# Patient Record
Sex: FEMALE | Race: WHITE | NOT HISPANIC OR LATINO | Employment: UNEMPLOYED | URBAN - METROPOLITAN AREA
[De-identification: names, ages, dates, MRNs, and addresses within clinical notes are randomized per-mention and may not be internally consistent; named-entity substitution may affect disease eponyms.]

---

## 2017-10-23 PROBLEM — F29 PSYCHOSIS: Status: ACTIVE | Noted: 2017-10-23

## 2021-03-09 DIAGNOSIS — Z34.90 PREGNANCY, UNSPECIFIED GESTATIONAL AGE: Primary | ICD-10-CM

## 2021-03-10 ENCOUNTER — PROCEDURE VISIT (OUTPATIENT)
Dept: OBSTETRICS AND GYNECOLOGY | Facility: CLINIC | Age: 36
End: 2021-03-10
Payer: MEDICAID

## 2021-03-10 DIAGNOSIS — Z34.90 PREGNANCY, UNSPECIFIED GESTATIONAL AGE: ICD-10-CM

## 2021-03-10 LAB
AMPHETAMINES (500): NEGATIVE NG/ML
BARBITURATES (200): NEGATIVE NG/ML
BENZODIAZEPINES: NEGATIVE NG/ML
COCAINE (150): NEGATIVE NG/ML
MARIJUANA QUANTITATION: >300 NG/ML (ref 0–50)
MARIJUANA, THC (50): ABNORMAL NG/ML
METHADONE: NEGATIVE NG/ML
OPIATES: NEGATIVE NG/ML
OXYCODONE: NEGATIVE NG/ML
PH: 5.7 (ref 4.5–8)
PHENCYCLIDINE (25): NEGATIVE NG/ML
URINE CREATININE D/S: 160.6 MG/DL

## 2021-03-10 PROCEDURE — 76817 PR US, OB, TRANSVAG APPROACH: ICD-10-PCS | Mod: S$GLB,,, | Performed by: STUDENT IN AN ORGANIZED HEALTH CARE EDUCATION/TRAINING PROGRAM

## 2021-03-10 PROCEDURE — 76817 TRANSVAGINAL US OBSTETRIC: CPT | Mod: S$GLB,,, | Performed by: STUDENT IN AN ORGANIZED HEALTH CARE EDUCATION/TRAINING PROGRAM

## 2021-03-11 PROBLEM — F12.10 TETRAHYDROCANNABINOL (THC) USE DISORDER, MILD, ABUSE: Status: ACTIVE | Noted: 2021-03-11

## 2021-03-11 LAB
ANTIBODY SCREEN: NEGATIVE
BLOOD GROUPING: NORMAL
BLOOD TYPE (D): POSITIVE
HBV SURFACE AG SERPL QL IA: NONREACTIVE
HCV C PCR RNA CONFIRM: ABNORMAL
HCV IGG SERPL QL IA: REACTIVE
HIV 1+2 AB+HIV1 P24 AG SERPL QL IA: NONREACTIVE
RUBELLA IGG SCREEN: NORMAL
SICKLE CELL PREP: NEGATIVE
SYPHILIS TREPONEMAL ANTIBODY: NONREACTIVE

## 2021-03-16 DIAGNOSIS — Z34.90 PREGNANCY, UNSPECIFIED GESTATIONAL AGE: Primary | ICD-10-CM

## 2021-03-16 LAB
HCV RNA QUANT PCR LOG: 5.36 LOG IU/ML
HCV RNA QUANT PCR: ABNORMAL IU/ML

## 2021-03-17 ENCOUNTER — PROCEDURE VISIT (OUTPATIENT)
Dept: OBSTETRICS AND GYNECOLOGY | Facility: CLINIC | Age: 36
End: 2021-03-17
Payer: MEDICAID

## 2021-03-17 DIAGNOSIS — Z34.90 PREGNANCY, UNSPECIFIED GESTATIONAL AGE: ICD-10-CM

## 2021-03-17 PROCEDURE — 76817 PR US, OB, TRANSVAG APPROACH: ICD-10-PCS | Mod: S$GLB,,, | Performed by: STUDENT IN AN ORGANIZED HEALTH CARE EDUCATION/TRAINING PROGRAM

## 2021-03-17 PROCEDURE — 76817 TRANSVAGINAL US OBSTETRIC: CPT | Mod: S$GLB,,, | Performed by: STUDENT IN AN ORGANIZED HEALTH CARE EDUCATION/TRAINING PROGRAM

## 2021-03-19 ENCOUNTER — OFFICE VISIT (OUTPATIENT)
Dept: OBSTETRICS AND GYNECOLOGY | Facility: CLINIC | Age: 36
End: 2021-03-19
Payer: MEDICAID

## 2021-03-19 VITALS
DIASTOLIC BLOOD PRESSURE: 72 MMHG | HEART RATE: 76 BPM | SYSTOLIC BLOOD PRESSURE: 109 MMHG | WEIGHT: 130.19 LBS | BODY MASS INDEX: 23.96 KG/M2 | HEIGHT: 62 IN

## 2021-03-19 DIAGNOSIS — O02.1 MISSED ABORTION: Primary | ICD-10-CM

## 2021-03-19 PROCEDURE — 99203 OFFICE O/P NEW LOW 30 MIN: CPT | Mod: TH,S$GLB,, | Performed by: STUDENT IN AN ORGANIZED HEALTH CARE EDUCATION/TRAINING PROGRAM

## 2021-03-19 PROCEDURE — 99203 PR OFFICE/OUTPT VISIT, NEW, LEVL III, 30-44 MIN: ICD-10-PCS | Mod: TH,S$GLB,, | Performed by: STUDENT IN AN ORGANIZED HEALTH CARE EDUCATION/TRAINING PROGRAM

## 2021-03-19 RX ORDER — MISOPROSTOL 200 UG/1
800 TABLET ORAL EVERY 12 HOURS
Qty: 8 TABLET | Refills: 0 | Status: SHIPPED | OUTPATIENT
Start: 2021-03-19 | End: 2022-09-09 | Stop reason: CLARIF

## 2021-03-30 ENCOUNTER — TELEPHONE (OUTPATIENT)
Dept: OBSTETRICS AND GYNECOLOGY | Facility: CLINIC | Age: 36
End: 2021-03-30

## 2021-03-31 ENCOUNTER — OFFICE VISIT (OUTPATIENT)
Dept: OBSTETRICS AND GYNECOLOGY | Facility: CLINIC | Age: 36
End: 2021-03-31
Payer: MEDICAID

## 2021-03-31 VITALS
WEIGHT: 132 LBS | BODY MASS INDEX: 24.14 KG/M2 | HEART RATE: 72 BPM | SYSTOLIC BLOOD PRESSURE: 112 MMHG | DIASTOLIC BLOOD PRESSURE: 70 MMHG

## 2021-03-31 DIAGNOSIS — Z33.2 ABORTION IN FIRST TRIMESTER: Primary | ICD-10-CM

## 2021-03-31 PROCEDURE — 99499 NO LOS: ICD-10-PCS | Mod: S$GLB,,, | Performed by: STUDENT IN AN ORGANIZED HEALTH CARE EDUCATION/TRAINING PROGRAM

## 2021-03-31 PROCEDURE — 99499 UNLISTED E&M SERVICE: CPT | Mod: S$GLB,,, | Performed by: STUDENT IN AN ORGANIZED HEALTH CARE EDUCATION/TRAINING PROGRAM

## 2022-09-09 PROBLEM — R65.10 SIRS (SYSTEMIC INFLAMMATORY RESPONSE SYNDROME): Status: ACTIVE | Noted: 2022-09-09

## 2022-09-09 PROBLEM — E87.8 ELECTROLYTE ABNORMALITY: Status: ACTIVE | Noted: 2022-09-09

## 2022-09-09 PROBLEM — R74.01 TRANSAMINITIS: Status: ACTIVE | Noted: 2022-09-09

## 2022-09-09 PROBLEM — F19.10 POLYSUBSTANCE ABUSE: Status: ACTIVE | Noted: 2022-09-09

## 2022-09-10 PROBLEM — B19.20 HEPATITIS C: Status: ACTIVE | Noted: 2022-09-10

## 2022-09-10 PROBLEM — R65.10 SIRS (SYSTEMIC INFLAMMATORY RESPONSE SYNDROME): Status: RESOLVED | Noted: 2022-09-09 | Resolved: 2022-09-10

## 2022-09-10 PROBLEM — K61.0 PERIANAL ABSCESS: Status: ACTIVE | Noted: 2022-09-10

## 2022-09-10 PROBLEM — I33.0 ACUTE BACTERIAL ENDOCARDITIS: Status: ACTIVE | Noted: 2022-09-10

## 2024-11-11 ENCOUNTER — HOSPITAL ENCOUNTER (EMERGENCY)
Facility: HOSPITAL | Age: 39
Discharge: HOME | End: 2024-11-11
Attending: INTERNAL MEDICINE
Payer: MEDICAID

## 2024-11-11 VITALS
OXYGEN SATURATION: 100 % | HEART RATE: 74 BPM | TEMPERATURE: 98 F | DIASTOLIC BLOOD PRESSURE: 72 MMHG | BODY MASS INDEX: 25.55 KG/M2 | WEIGHT: 138.88 LBS | HEIGHT: 62 IN | RESPIRATION RATE: 17 BRPM | SYSTOLIC BLOOD PRESSURE: 118 MMHG

## 2024-11-11 DIAGNOSIS — Z32.01 PREGNANCY TEST POSITIVE: Primary | ICD-10-CM

## 2024-11-11 LAB
B-HCG FREE SERPL-ACNC: ABNORMAL MIU/ML
B-HCG UR QL: POSITIVE
BACTERIA #/AREA URNS AUTO: ABNORMAL /HPF
BILIRUB UR QL STRIP.AUTO: NEGATIVE
CLARITY UR: CLEAR
COLOR UR AUTO: ABNORMAL
CTP QC/QA: YES
GLUCOSE UR QL STRIP: NORMAL
HGB UR QL STRIP: NEGATIVE
HOLD SPECIMEN: NORMAL
HYALINE CASTS #/AREA URNS LPF: ABNORMAL /LPF
KETONES UR QL STRIP: NEGATIVE
LEUKOCYTE ESTERASE UR QL STRIP: NEGATIVE
NITRITE UR QL STRIP: NEGATIVE
PH UR STRIP: 7 [PH]
PROT UR QL STRIP: NEGATIVE
RBC #/AREA URNS AUTO: ABNORMAL /HPF
SP GR UR STRIP.AUTO: 1.01 (ref 1–1.03)
SQUAMOUS #/AREA URNS LPF: ABNORMAL /HPF
UROBILINOGEN UR STRIP-ACNC: NORMAL
WBC #/AREA URNS AUTO: ABNORMAL /HPF

## 2024-11-11 PROCEDURE — 81001 URINALYSIS AUTO W/SCOPE: CPT | Performed by: INTERNAL MEDICINE

## 2024-11-11 PROCEDURE — 84702 CHORIONIC GONADOTROPIN TEST: CPT | Performed by: INTERNAL MEDICINE

## 2024-11-11 PROCEDURE — 99284 EMERGENCY DEPT VISIT MOD MDM: CPT

## 2024-11-11 PROCEDURE — 81025 URINE PREGNANCY TEST: CPT | Performed by: INTERNAL MEDICINE

## 2024-11-11 NOTE — ED PROVIDER NOTES
Encounter Date: 2024       History     Chief Complaint   Patient presents with    Possible Pregnancy     C/o of intermittent lower abd pain with cramping x 4 days, denies vaginal bleeding/discharge; + UPT 4 days ago. LMP 2024. Pt coming from rehab ([facility]). Hx of 2 miscarriages.      Presents from a rehabilitation facility with a positive pregnancy test. Pt states unsure of exact day of LMP but was the last week of September. A2. States in rehab for alcohol and marijuana abuse. Last drink Oct 28. Denies vaginal bleeding, discharge, dysuria or pelvic pain. Presents from confirmation of pregnancy and reassurance of current medication list in pregnancy    The history is provided by the patient.     Review of patient's allergies indicates:  No Known Allergies  Past Medical History:   Diagnosis Date    ADD (attention deficit disorder)     on [facility]    Addiction to drug 2017    Offically diagnosed at [facility] in     ADHD (attention deficit hyperactivity disorder) 2008    ADD treatment on an outpatient basis in [place]    Alcohol abuse 2015    DWI and went to [place] for treatment.    Anxiety 2017    [facility]    Depression 2017    [facility]    Hep B complicating pregnancy     Hep C w/o coma, chronic     Herpes simplex virus (HSV) infection     Hx of psychiatric care 2008    [facility] for ADD    Liver disease     [facility]    Psychiatric problem 2008    Restlessness    Psychosis 2017    [facility]    Therapy 2008    Outpatient therapy in [place]    Thyroid disease [address] [place] [address]     Past Surgical History:   Procedure Laterality Date    TRANSESOPHAGEAL ECHOCARDIOGRAPHY N/A 2022    Procedure: ECHOCARDIOGRAM, TRANSESOPHAGEAL;  Surgeon: Adiel Kirby MD;  Location: [facility];  Service: Cardiology;  Laterality: N/A;     Family History   Problem Relation Name Age of Onset    Anxiety disorder Mother  Chaya     No Known Problems Father Paulino     Bipolar disorder Brother Mike     No Known Problems Maternal Aunt Wm     No Known Problems Maternal Uncle Nevada     No Known Problems Paternal Uncle Caesar     No Known Problems Maternal Grandfather Ronni     No Known Problems Maternal Grandmother Jayna     No Known Problems Paternal Grandfather Phi     No Known Problems Paternal Grandmother Cathie     No Known Problems Cousin Bud     No Known Problems Cousin Karen      Social History     Tobacco Use    Smoking status: Every Day     Types: Cigarettes     Start date: 1/25/2008    Smokeless tobacco: Never   Substance Use Topics    Alcohol use: Not Currently     Alcohol/week: 1.0 standard drink of alcohol     Types: 1 Cans of beer per week     Comment: In the past, says she no longer uses alcohol.     Drug use: Not Currently     Types: Cocaine, Marijuana, Heroin     Review of Systems   Genitourinary:  Positive for menstrual problem.       Physical Exam     Initial Vitals [11/11/24 1243]   BP Pulse Resp Temp SpO2   130/71 77 16 98.1 °F (36.7 °C) 100 %      MAP       --         Physical Exam    Nursing note and vitals reviewed.  Constitutional: She appears well-developed and well-nourished. No distress.   HENT:   Head: Normocephalic and atraumatic. Mouth/Throat: Oropharynx is clear and moist.   Eyes: Conjunctivae and EOM are normal. Pupils are equal, round, and reactive to light.   Neck: Neck supple. No JVD present.   Normal range of motion.  Cardiovascular:  Normal rate, regular rhythm, normal heart sounds and intact distal pulses.           Pulmonary/Chest: Breath sounds normal.   Abdominal: Abdomen is soft. Bowel sounds are normal. She exhibits no distension. There is no abdominal tenderness. There is no rebound and no guarding.   Musculoskeletal:         General: No edema. Normal range of motion.      Cervical back: Normal range of motion and neck supple.     Neurological: She is alert and oriented to  person, place, and time. She has normal strength. GCS score is 15. GCS eye subscore is 4. GCS verbal subscore is 5. GCS motor subscore is 6.   Skin: Skin is warm and dry. No rash noted.   Psychiatric: Her behavior is normal. Thought content normal.         ED Course   ED US Pelvis OB    Date/Time: 11/11/2024 1:20 PM    Performed by: Ramiro Worrell MD  Authorized by: Ramiro Worrell MD    Indication:  Pregnancy  Identified Structures:  Uterus sagittal, Cul-de-sac/Pouch of Pascual, Left adnexa, Hepatorenal space/Hinson's Pouch, Right adnexa and Uterus transverse  Definitive IUP:  Present  Uterine Contents:  Yolk sac  Free fluid in cul-de-sac:  Absent  Free fluid in hepatorenal space:  Absent   6  Location of IUP:  Fundus  Myometrial Mantle:  Adequate  Right adnexa:  Normal  Left adnexa:  Normal  Impression:  IUP  Charge?:  No (educational)    Labs Reviewed   HCG, QUANTITATIVE - Abnormal       Result Value    Beta HCG Quant 23,756.89 (*)    URINALYSIS, REFLEX TO URINE CULTURE - Abnormal    Color, UA Light-Yellow      Appearance, UA Clear      Specific Gravity, UA 1.012      pH, UA 7.0      Protein, UA Negative      Glucose, UA Normal      Ketones, UA Negative      Blood, UA Negative      Bilirubin, UA Negative      Urobilinogen, UA Normal      Nitrites, UA Negative      Leukocyte Esterase, UA Negative      RBC, UA 0-5      WBC, UA 0-5      Bacteria, UA None Seen      Squamous Epithelial Cells, UA Trace (*)     Hyaline Casts, UA None Seen     POCT URINE PREGNANCY - Abnormal    POC Preg Test, Ur Positive (*)      Acceptable Yes     EXTRA TUBES    Narrative:     The following orders were created for panel order EXTRA TUBES.  Procedure                               Abnormality         Status                     ---------                               -----------         ------                     Light Blue Top Hold[[id number]]                             In process                  Red Top Hold[[id number]]                                    In process                 Lavender Top Hold[[id number]]                               In process                 Gold Top Hold[[id number]]                                   In process                 Pink Top Hold[[id number]]                                   In process                   Please view results for these tests on the individual orders.   LIGHT BLUE TOP HOLD   RED TOP HOLD   LAVENDER TOP HOLD   GOLD TOP HOLD   PINK TOP HOLD          Imaging Results    None          Medications - No data to display  Medical Decision Making  Amount and/or Complexity of Data Reviewed  Labs: ordered. Decision-making details documented in ED Course.      Additional MDM:   Differential Diagnosis:   Dif Dx:  Ovarian cyst, ovarian torsion, ectopic pregnancy, malignancy among others                                      Clinical Impression:  Final diagnoses:  [Z32.01] Pregnancy test positive (Primary)          ED Disposition Condition    Discharge Stable          ED Prescriptions    None       Follow-up Information       Follow up With Specialties Details Why Contact Info    [facility] - Emergency Dept Emergency Medicine  If symptoms worsen [address] [facility] [address] [phone]    [facility] Family Medicine Schedule an appointment as soon as possible for a visit in 1 month  [address] [facility] [address] [phone]             Ramiro Worrell MD  11/11/24 5643

## 2024-12-03 ENCOUNTER — HOSPITAL ENCOUNTER (EMERGENCY)
Facility: HOSPITAL | Age: 39
Discharge: HOME | End: 2024-12-03
Attending: EMERGENCY MEDICINE
Payer: MEDICAID

## 2024-12-03 VITALS
HEART RATE: 78 BPM | SYSTOLIC BLOOD PRESSURE: 127 MMHG | WEIGHT: 140 LBS | DIASTOLIC BLOOD PRESSURE: 109 MMHG | BODY MASS INDEX: 25.76 KG/M2 | OXYGEN SATURATION: 100 % | HEIGHT: 62 IN | TEMPERATURE: 98 F | RESPIRATION RATE: 18 BRPM

## 2024-12-03 DIAGNOSIS — O41.8X10 SUBCHORIONIC HEMATOMA IN FIRST TRIMESTER, SINGLE OR UNSPECIFIED FETUS: ICD-10-CM

## 2024-12-03 DIAGNOSIS — O46.8X1 SUBCHORIONIC HEMATOMA IN FIRST TRIMESTER, SINGLE OR UNSPECIFIED FETUS: ICD-10-CM

## 2024-12-03 DIAGNOSIS — O20.0 THREATENED MISCARRIAGE: ICD-10-CM

## 2024-12-03 DIAGNOSIS — O46.90 VAGINAL BLEEDING IN PREGNANCY: Primary | ICD-10-CM

## 2024-12-03 LAB
ALBUMIN SERPL-MCNC: 3.3 G/DL (ref 3.5–5)
ALBUMIN/GLOB SERPL: 1.4 RATIO (ref 1.1–2)
ALP SERPL-CCNC: 41 UNIT/L (ref 40–150)
ALT SERPL-CCNC: 18 UNIT/L (ref 0–55)
AMORPH URATE CRY URNS QL MICRO: ABNORMAL /UL
ANION GAP SERPL CALC-SCNC: 6 MEQ/L
AST SERPL-CCNC: 18 UNIT/L (ref 5–34)
B-HCG FREE SERPL-ACNC: ABNORMAL MIU/ML
B-HCG UR QL: POSITIVE
BACTERIA #/AREA URNS AUTO: ABNORMAL /HPF
BASOPHILS # BLD AUTO: 0.04 X10(3)/MCL
BASOPHILS NFR BLD AUTO: 0.4 %
BILIRUB SERPL-MCNC: 0.4 MG/DL
BILIRUB UR QL STRIP.AUTO: NEGATIVE
BUN SERPL-MCNC: 11.2 MG/DL (ref 7–18.7)
C TRACH DNA SPEC QL NAA+PROBE: NOT DETECTED
CALCIUM SERPL-MCNC: 8.6 MG/DL (ref 8.4–10.2)
CHLORIDE SERPL-SCNC: 106 MMOL/L (ref 98–107)
CLARITY UR: ABNORMAL
CO2 SERPL-SCNC: 24 MMOL/L (ref 22–29)
COLOR UR AUTO: YELLOW
CREAT SERPL-MCNC: 0.69 MG/DL (ref 0.55–1.02)
CREAT/UREA NIT SERPL: 16
CTP QC/QA: YES
EOSINOPHIL # BLD AUTO: 0.24 X10(3)/MCL (ref 0–0.9)
EOSINOPHIL NFR BLD AUTO: 2.7 %
ERYTHROCYTE [DISTWIDTH] IN BLOOD BY AUTOMATED COUNT: 12.1 % (ref 11.5–17)
GFR SERPLBLD CREATININE-BSD FMLA CKD-EPI: >60 ML/MIN/1.73/M2
GLOBULIN SER-MCNC: 2.4 GM/DL (ref 2.4–3.5)
GLUCOSE SERPL-MCNC: 93 MG/DL (ref 74–100)
GLUCOSE UR QL STRIP: NORMAL
GROUP & RH: NORMAL
HCT VFR BLD AUTO: 35.3 % (ref 37–47)
HGB BLD-MCNC: 12.2 G/DL (ref 12–16)
HGB UR QL STRIP: ABNORMAL
IMM GRANULOCYTES # BLD AUTO: 0.03 X10(3)/MCL (ref 0–0.04)
IMM GRANULOCYTES NFR BLD AUTO: 0.3 %
KETONES UR QL STRIP: NEGATIVE
LEUKOCYTE ESTERASE UR QL STRIP: 75
LYMPHOCYTES # BLD AUTO: 2.75 X10(3)/MCL (ref 0.6–4.6)
LYMPHOCYTES NFR BLD AUTO: 30.7 %
MCH RBC QN AUTO: 30.9 PG (ref 27–31)
MCHC RBC AUTO-ENTMCNC: 34.6 G/DL (ref 33–36)
MCV RBC AUTO: 89.4 FL (ref 80–94)
MONOCYTES # BLD AUTO: 0.45 X10(3)/MCL (ref 0.1–1.3)
MONOCYTES NFR BLD AUTO: 5 %
MUCOUS THREADS URNS QL MICRO: ABNORMAL /LPF
N GONORRHOEA DNA SPEC QL NAA+PROBE: NOT DETECTED
NEUTROPHILS # BLD AUTO: 5.46 X10(3)/MCL (ref 2.1–9.2)
NEUTROPHILS NFR BLD AUTO: 60.9 %
NITRITE UR QL STRIP: NEGATIVE
NRBC BLD AUTO-RTO: 0 %
PH UR STRIP: 6.5 [PH]
PLATELET # BLD AUTO: 179 X10(3)/MCL (ref 130–400)
PMV BLD AUTO: 10.2 FL (ref 7.4–10.4)
POTASSIUM SERPL-SCNC: 3.8 MMOL/L (ref 3.5–5.1)
PROT SERPL-MCNC: 5.7 GM/DL (ref 6.4–8.3)
PROT UR QL STRIP: ABNORMAL
RBC # BLD AUTO: 3.95 X10(6)/MCL (ref 4.2–5.4)
RBC #/AREA URNS AUTO: ABNORMAL /HPF
SODIUM SERPL-SCNC: 136 MMOL/L (ref 136–145)
SOURCE (OHS): NORMAL
SP GR UR STRIP.AUTO: 1.02 (ref 1–1.03)
SQUAMOUS #/AREA URNS LPF: ABNORMAL /HPF
UROBILINOGEN UR STRIP-ACNC: NORMAL
WBC # BLD AUTO: 8.97 X10(3)/MCL (ref 4.5–11.5)
WBC #/AREA URNS AUTO: ABNORMAL /HPF

## 2024-12-03 PROCEDURE — 25000003 PHARM REV CODE 250: Performed by: EMERGENCY MEDICINE

## 2024-12-03 PROCEDURE — 81025 URINE PREGNANCY TEST: CPT | Performed by: EMERGENCY MEDICINE

## 2024-12-03 PROCEDURE — 85025 COMPLETE CBC W/AUTO DIFF WBC: CPT | Performed by: EMERGENCY MEDICINE

## 2024-12-03 PROCEDURE — 87591 N.GONORRHOEAE DNA AMP PROB: CPT | Performed by: EMERGENCY MEDICINE

## 2024-12-03 PROCEDURE — 80053 COMPREHEN METABOLIC PANEL: CPT | Performed by: EMERGENCY MEDICINE

## 2024-12-03 PROCEDURE — 84702 CHORIONIC GONADOTROPIN TEST: CPT | Performed by: EMERGENCY MEDICINE

## 2024-12-03 PROCEDURE — 86901 BLOOD TYPING SEROLOGIC RH(D): CPT | Performed by: EMERGENCY MEDICINE

## 2024-12-03 PROCEDURE — 99284 EMERGENCY DEPT VISIT MOD MDM: CPT | Mod: 25

## 2024-12-03 PROCEDURE — 81001 URINALYSIS AUTO W/SCOPE: CPT | Performed by: EMERGENCY MEDICINE

## 2024-12-03 RX ORDER — ACETAMINOPHEN 500 MG
1000 TABLET ORAL
Status: COMPLETED | OUTPATIENT
Start: 2024-12-03 | End: 2024-12-03

## 2024-12-03 RX ADMIN — ACETAMINOPHEN 1000 MG: 500 TABLET ORAL at 07:12

## 2024-12-03 NOTE — Clinical Note
"Joana"Juani Thomas was seen and treated in our emergency department on 12/3/2024.  She may return to work on 12/04/2024.       If you have any questions or concerns, please don't hesitate to call.      Ml Sheehan MD"

## 2024-12-03 NOTE — ED PROVIDER NOTES
Encounter Date: 12/3/2024       History     Chief Complaint   Patient presents with    Vaginal Bleeding     Pt c/o vaginal bleeding that started this AM, reports she's 12wks pregnant.      An year old , approximately 10 weeks by LMP on 2024, here for vaginal spotting and pelvic cramping that began this morning.  No passage of tissue reported.  Her last 2 pregnancies ended in miscarriage, therefore she became concerned.  She denies any nausea, vomiting, or urinary symptoms.  She does suffer from constipation but is passing stool.  She is currently taking Flagyl for BV. She is also on suboxone, seroquel and vraylar for history of substance abuse and bipolar disorder. She has not yet established care with OB; awaiting an appointment at [facility] scheduled in January. She is attempting to get an earlier appointment with Dr. Hensley.     The history is provided by the patient.     Review of patient's allergies indicates:  No Known Allergies  Past Medical History:   Diagnosis Date    ADD (attention deficit disorder)     on [facility]    Addiction to drug 2017    Offically diagnosed at [facility] in     ADHD (attention deficit hyperactivity disorder) 2008    ADD treatment on an outpatient basis in [place]    Alcohol abuse 2015    DWI and went to [place] for treatment.    Anxiety 2017    [facility]    Depression     [facility]    Hep B complicating pregnancy     Hep C w/o coma, chronic     Herpes simplex virus (HSV) infection     Hx of psychiatric care 2008    [facility] for [facility]    Liver disease     [facility]    Psychiatric problem 2008    Restlessness    Psychosis 2017    [facility]    Therapy 2008    Outpatient therapy in [place]    Thyroid disease     [facility]     Past Surgical History:   Procedure Laterality Date    TRANSESOPHAGEAL ECHOCARDIOGRAPHY N/A 2022    Procedure: ECHOCARDIOGRAM, TRANSESOPHAGEAL;   Surgeon: Adiel Kirby MD;  Location: [facility];  Service: Cardiology;  Laterality: N/A;     Family History   Problem Relation Name Age of Onset    Anxiety disorder Mother Chaya     No Known Problems Father Paulino     Bipolar disorder Brother Mike     No Known Problems Maternal Aunt Wm     No Known Problems Maternal Uncle Nevada     No Known Problems Paternal Uncle Caesar     No Known Problems Maternal Grandfather Ronni     No Known Problems Maternal Grandmother Jayna     No Known Problems Paternal Grandfather Phi     No Known Problems Paternal Grandmother Cathie     No Known Problems Cousin Bud     No Known Problems Cousin Karen      Social History     Tobacco Use    Smoking status: Every Day     Types: Cigarettes     Start date: 1/25/2008    Smokeless tobacco: Never   Substance Use Topics    Alcohol use: Not Currently     Alcohol/week: 1.0 standard drink of alcohol     Types: 1 Cans of beer per week     Comment: In the past, says she no longer uses alcohol.     Drug use: Not Currently     Types: Cocaine, Marijuana, Heroin     Review of Systems   Constitutional:  Negative for fever.   HENT:  Negative for nosebleeds.    Respiratory:  Negative for cough and shortness of breath.    Cardiovascular:  Negative for chest pain and leg swelling.   Gastrointestinal:  Positive for constipation. Negative for abdominal pain, blood in stool, diarrhea, nausea and vomiting.   Genitourinary:  Positive for pelvic pain and vaginal bleeding. Negative for dysuria, flank pain, frequency and hematuria.   Musculoskeletal:  Negative for back pain.   Skin:  Negative for pallor.   Neurological:  Negative for light-headedness and headaches.       Physical Exam     Initial Vitals [12/03/24 0631]   BP Pulse Resp Temp SpO2   (!) 109/59 77 16 97.7 °F (36.5 °C) 99 %      MAP       --         Physical Exam    Nursing note and vitals reviewed.  Constitutional: She appears well-developed and well-nourished. She is not  diaphoretic. No distress.   HENT:   Head: Normocephalic and atraumatic. Mouth/Throat: Oropharynx is clear and moist.   Eyes: Conjunctivae and EOM are normal.   Neck: Neck supple.   Cardiovascular:  Normal rate, regular rhythm and normal heart sounds.           Pulmonary/Chest: Breath sounds normal. No respiratory distress.   Abdominal: Abdomen is soft. Bowel sounds are normal. She exhibits no distension. There is abdominal tenderness (mild suprapubic tenderness). There is no rebound and no guarding.   Genitourinary:    Vaginal discharge (bloody discharge, scant) present.      Genitourinary Comments: No adnexal masses or tenderness  Cervical os is closed  No cervicitis or CMT     Musculoskeletal:         General: No edema. Normal range of motion.      Cervical back: Neck supple.     Neurological: She is alert and oriented to person, place, and time. She has normal strength.   Skin: Skin is warm and dry. Capillary refill takes less than 2 seconds. No pallor.   Psychiatric: She has a normal mood and affect.         ED Course   Procedures  Labs Reviewed   COMPREHENSIVE METABOLIC PANEL - Abnormal       Result Value    Sodium 136      Potassium 3.8      Chloride 106      CO2 24      Glucose 93      Blood Urea Nitrogen 11.2      Creatinine 0.69      Calcium 8.6      Protein Total 5.7 (*)     Albumin 3.3 (*)     Globulin 2.4      Albumin/Globulin Ratio 1.4      Bilirubin Total 0.4      ALP 41      ALT 18      AST 18      eGFR >60      Anion Gap 6.0      BUN/Creatinine Ratio 16     HCG, QUANTITATIVE - Abnormal    Beta HCG Quant 143,117.83 (*)    URINALYSIS, REFLEX TO URINE CULTURE - Abnormal    Color, UA Yellow      Appearance, UA Turbid (*)     Specific Gravity, UA 1.019      pH, UA 6.5      Protein, UA Trace (*)     Glucose, UA Normal      Ketones, UA Negative      Blood, UA 3+ (*)     Bilirubin, UA Negative      Urobilinogen, UA Normal      Nitrites, UA Negative      Leukocyte Esterase, UA 75 (*)     RBC, UA 0-5      WBC,  UA 0-5      Bacteria, UA Trace      Squamous Epithelial Cells, UA Moderate (*)     Mucous, UA Trace (*)     Amorphous Crystal, UA Trace (*)    CBC WITH DIFFERENTIAL - Abnormal    WBC 8.97      RBC 3.95 (*)     Hgb 12.2      Hct 35.3 (*)     MCV 89.4      MCH 30.9      MCHC 34.6      RDW 12.1      Platelet 179      MPV 10.2      Neut % 60.9      Lymph % 30.7      Mono % 5.0      Eos % 2.7      Basophil % 0.4      Lymph # 2.75      Neut # 5.46      Mono # 0.45      Eos # 0.24      Baso # 0.04      IG# 0.03      IG% 0.3      NRBC% 0.0     POCT URINE PREGNANCY - Abnormal    POC Preg Test, Ur Positive (*)      Acceptable Yes     CHLAMYDIA/GONORRHOEAE(GC), PCR   CBC W/ AUTO DIFFERENTIAL    Narrative:     The following orders were created for panel order CBC W/ AUTO DIFFERENTIAL.  Procedure                               Abnormality         Status                     ---------                               -----------         ------                     CBC with Differential[[id number]]       Abnormal            Final result                 Please view results for these tests on the individual orders.   GROUP & RH    Group & Rh O POS            Imaging Results              US OB <14 Wks, TransAbd, Single Gestation (Final result)  Result time 12/03/24 08:31:55   Procedure changed from US OB <14 Wks TransAbd & TransVag, Single Gestation (XPD)     Final result by Cy Key MD (12/03/24 08:31:55)                   Impression:      Single live intrauterine pregnancy with ultrasound age by crown-rump length 9 weeks 4 days. Detailed fetal anatomic survey is recommended as an outpatient under OB supervision at 18-20 weeks gestation.      Electronically signed by: Cy Key  Date:    12/03/2024  Time:    08:31               Narrative:    EXAMINATION:  US OB <14 WEEKS TRANSABDOM, SINGLE GESTATION    CLINICAL HISTORY:  Vag Bleeding;    COMPARISON:  None    FINDINGS:  Transabdominal scanning of the pelvis with  grayscale, color and spectral Doppler.    Anteverted uterus measures 11 cm in length.  There is single live intrauterine pregnancy.  The ultrasound age by crown-rump length is 9 weeks 4 days.  Crown-rump length 27 mm.  Embryonic heart rate 159-163 BPM.  There is subchorionic hemorrhage measuring 24 x 23 x 13 mm.    Right ovary normal in appearance for age with vascular flow demonstrated.    Left ovary not seen.    No adnexal mass or significant pelvic free fluid.                                       Medications   acetaminophen tablet 1,000 mg (1,000 mg Oral Given 12/3/24 0716)     Medical Decision Making  39-year-old , approximately 10 weeks pregnant, here for vaginal spotting and pelvic pain.  She is hemodynamically stable, abdominal exam is benign, cervical os is closed.  Labs with pelvic ultrasound will be obtained.  Tylenol will be given for pain.  Patient is attempting to follow up with Dr. Hensley this week and has been advised to make that appointment as soon as possible.     Differential diagnosis includes but isn't limited to threatened miscarriage, ectopic pregnancy, subchorionic hemorrhage, cervicitis, and others    Amount and/or Complexity of Data Reviewed  Labs: ordered. Decision-making details documented in ED Course.  Radiology: ordered. Decision-making details documented in ED Course.    Risk  OTC drugs.               ED Course as of 24 0928   Tue Dec 03, 2024   0922 Labs unremarkable, I do not suspect UTI at this time.  Ultrasound consistent with single, live IUP at 9 weeks 4 days.  There is also a small subchorionic hemorrhage.  I have discussed these findings with the patient along with strict return precautions and the need for pelvic rest and hydrate.  She is strongly advised to contact Dr. Hensley today to make an appointment this week or early next week for re-evaluation. She will be contacted if GC/C is positive. She is discharged in stable condition. [RB]      ED Course User  "Index  [RB] Ml Sheehan MD                Blood pressure (!) 127/109, pulse 78, temperature 97.7 °F (36.5 °C), temperature source Oral, resp. rate 18, height 5' 2" (1.575 m), weight 63.5 kg (140 lb), last menstrual period 09/21/2024, SpO2 100%.  s           Clinical Impression:  Final diagnoses:  [O46.90] Vaginal bleeding in pregnancy (Primary)  [O20.0] Threatened miscarriage  [O41.8X10, O46.8X1] Subchorionic hematoma in first trimester, single or unspecified fetus          ED Disposition Condition    Discharge Stable          ED Prescriptions    None       Follow-up Information       Follow up With Specialties Details Why Contact Info    [facility] - Emergency Dept Emergency Medicine  As needed, If symptoms worsen [address] [place] [id number]  [phone]             Ml Sheehan MD  12/03/24 0925       Ml Sheehan MD  12/03/24 0928    "

## 2025-01-07 ENCOUNTER — HOSPITAL ENCOUNTER (OUTPATIENT)
Dept: RADIOLOGY | Facility: HOSPITAL | Age: 40
Discharge: HOME | End: 2025-01-07
Attending: OBSTETRICS & GYNECOLOGY
Payer: MEDICAID

## 2025-01-07 ENCOUNTER — OFFICE VISIT (OUTPATIENT)
Dept: FAMILY MEDICINE | Facility: CLINIC | Age: 40
End: 2025-01-07
Payer: MEDICAID

## 2025-01-07 ENCOUNTER — TELEPHONE (OUTPATIENT)
Dept: FAMILY MEDICINE | Facility: CLINIC | Age: 40
End: 2025-01-07
Payer: MEDICAID

## 2025-01-07 VITALS
SYSTOLIC BLOOD PRESSURE: 124 MMHG | RESPIRATION RATE: 18 BRPM | TEMPERATURE: 98 F | HEIGHT: 62 IN | WEIGHT: 150.38 LBS | DIASTOLIC BLOOD PRESSURE: 67 MMHG | BODY MASS INDEX: 27.67 KG/M2 | HEART RATE: 68 BPM | OXYGEN SATURATION: 100 %

## 2025-01-07 DIAGNOSIS — O44.40 LOW-LYING PLACENTA: ICD-10-CM

## 2025-01-07 DIAGNOSIS — Z34.90 PREGNANCY: ICD-10-CM

## 2025-01-07 DIAGNOSIS — O99.322 SUBOXONE MAINTENANCE TREATMENT COMPLICATING PREGNANCY, ANTEPARTUM, SECOND TRIMESTER: ICD-10-CM

## 2025-01-07 DIAGNOSIS — B00.9 HSV INFECTION: ICD-10-CM

## 2025-01-07 DIAGNOSIS — F19.10 POLYSUBSTANCE ABUSE: ICD-10-CM

## 2025-01-07 DIAGNOSIS — Z3A.15 15 WEEKS GESTATION OF PREGNANCY: Primary | ICD-10-CM

## 2025-01-07 DIAGNOSIS — Z86.19 HISTORY OF HEPATITIS C: ICD-10-CM

## 2025-01-07 DIAGNOSIS — F11.20 SUBOXONE MAINTENANCE TREATMENT COMPLICATING PREGNANCY, ANTEPARTUM, SECOND TRIMESTER: ICD-10-CM

## 2025-01-07 DIAGNOSIS — Z86.19 HISTORY OF HEPATITIS B: ICD-10-CM

## 2025-01-07 LAB
ALBUMIN SERPL-MCNC: 3.8 G/DL (ref 3.5–5)
ALBUMIN/GLOB SERPL: 1 RATIO (ref 1.1–2)
ALP SERPL-CCNC: 56 UNIT/L (ref 40–150)
ALT SERPL-CCNC: 14 UNIT/L (ref 0–55)
AMPHET UR QL SCN: NEGATIVE
ANION GAP SERPL CALC-SCNC: 5 MEQ/L
AST SERPL-CCNC: 20 UNIT/L (ref 5–34)
BARBITURATE SCN PRESENT UR: NEGATIVE
BASOPHILS # BLD AUTO: 0.06 X10(3)/MCL
BASOPHILS NFR BLD AUTO: 0.5 %
BENZODIAZ UR QL SCN: NEGATIVE
BILIRUB SERPL-MCNC: 0.4 MG/DL
BILIRUB SERPL-MCNC: NORMAL MG/DL
BLOOD URINE, POC: NORMAL
BUN SERPL-MCNC: 9.1 MG/DL (ref 7–18.7)
CALCIUM SERPL-MCNC: 9.4 MG/DL (ref 8.4–10.2)
CANNABINOIDS UR QL SCN: NEGATIVE
CHLORIDE SERPL-SCNC: 105 MMOL/L (ref 98–107)
CLARITY, POC UA: NORMAL
CO2 SERPL-SCNC: 27 MMOL/L (ref 22–29)
COCAINE UR QL SCN: NEGATIVE
COLOR, POC UA: YELLOW
CREAT SERPL-MCNC: 0.6 MG/DL (ref 0.55–1.02)
CREAT/UREA NIT SERPL: 15
EOSINOPHIL # BLD AUTO: 0.26 X10(3)/MCL (ref 0–0.9)
EOSINOPHIL NFR BLD AUTO: 2.2 %
ERYTHROCYTE [DISTWIDTH] IN BLOOD BY AUTOMATED COUNT: 12.6 % (ref 11.5–17)
FENTANYL UR QL SCN: NEGATIVE
GFR SERPLBLD CREATININE-BSD FMLA CKD-EPI: >60 ML/MIN/1.73/M2
GLOBULIN SER-MCNC: 3.9 GM/DL (ref 2.4–3.5)
GLUCOSE SERPL-MCNC: 78 MG/DL (ref 74–100)
GLUCOSE UR QL STRIP: NORMAL
GROUP & RH: NORMAL
HCT VFR BLD AUTO: 38.1 % (ref 37–47)
HCV AB SERPL QL IA: REACTIVE
HGB BLD-MCNC: 12.9 G/DL (ref 12–16)
HIV 1+2 AB+HIV1 P24 AG SERPL QL IA: NONREACTIVE
IMM GRANULOCYTES # BLD AUTO: 0.09 X10(3)/MCL (ref 0–0.04)
IMM GRANULOCYTES NFR BLD AUTO: 0.8 %
INDIRECT COOMBS: NORMAL
KETONES UR QL STRIP: NORMAL
LEUKOCYTE ESTERASE URINE, POC: NORMAL
LYMPHOCYTES # BLD AUTO: 3.53 X10(3)/MCL (ref 0.6–4.6)
LYMPHOCYTES NFR BLD AUTO: 30.4 %
MCH RBC QN AUTO: 30.4 PG (ref 27–31)
MCHC RBC AUTO-ENTMCNC: 33.9 G/DL (ref 33–36)
MCV RBC AUTO: 89.9 FL (ref 80–94)
MDMA UR QL SCN: NEGATIVE
MONOCYTES # BLD AUTO: 0.55 X10(3)/MCL (ref 0.1–1.3)
MONOCYTES NFR BLD AUTO: 4.7 %
NEUTROPHILS # BLD AUTO: 7.14 X10(3)/MCL (ref 2.1–9.2)
NEUTROPHILS NFR BLD AUTO: 61.4 %
NITRITE, POC UA: NORMAL
NRBC BLD AUTO-RTO: 0 %
OPIATES UR QL SCN: NEGATIVE
PCP UR QL: NEGATIVE
PH UR: 6.5 [PH] (ref 3–11)
PH, POC UA: 6.5
PLATELET # BLD AUTO: 244 X10(3)/MCL (ref 130–400)
PMV BLD AUTO: 10.5 FL (ref 7.4–10.4)
POTASSIUM SERPL-SCNC: 3.8 MMOL/L (ref 3.5–5.1)
PROT SERPL-MCNC: 7.7 GM/DL (ref 6.4–8.3)
PROTEIN, POC: NORMAL
RBC # BLD AUTO: 4.24 X10(6)/MCL (ref 4.2–5.4)
SODIUM SERPL-SCNC: 137 MMOL/L (ref 136–145)
SPECIFIC GRAVITY, POC UA: 1.02
SPECIFIC GRAVITY, URINE AUTO (.000) (OHS): 1.02 (ref 1–1.03)
SPECIMEN OUTDATE: NORMAL
T PALLIDUM AB SER QL: NONREACTIVE
UROBILINOGEN, POC UA: 0.2
WBC # BLD AUTO: 11.63 X10(3)/MCL (ref 4.5–11.5)

## 2025-01-07 PROCEDURE — 87340 HEPATITIS B SURFACE AG IA: CPT

## 2025-01-07 PROCEDURE — 80053 COMPREHEN METABOLIC PANEL: CPT

## 2025-01-07 PROCEDURE — 81511 FTL CGEN ABNOR FOUR ANAL: CPT

## 2025-01-07 PROCEDURE — 86850 RBC ANTIBODY SCREEN: CPT

## 2025-01-07 PROCEDURE — 87086 URINE CULTURE/COLONY COUNT: CPT

## 2025-01-07 PROCEDURE — 80307 DRUG TEST PRSMV CHEM ANLYZR: CPT

## 2025-01-07 PROCEDURE — 85025 COMPLETE CBC W/AUTO DIFF WBC: CPT

## 2025-01-07 PROCEDURE — 76805 OB US >/= 14 WKS SNGL FETUS: CPT | Mod: TC

## 2025-01-07 PROCEDURE — 86762 RUBELLA ANTIBODY: CPT

## 2025-01-07 PROCEDURE — 86803 HEPATITIS C AB TEST: CPT

## 2025-01-07 PROCEDURE — 87389 HIV-1 AG W/HIV-1&-2 AB AG IA: CPT

## 2025-01-07 PROCEDURE — 99214 OFFICE O/P EST MOD 30 MIN: CPT | Mod: PBBFAC,25

## 2025-01-07 PROCEDURE — 86787 VARICELLA-ZOSTER ANTIBODY: CPT

## 2025-01-07 PROCEDURE — 85660 RBC SICKLE CELL TEST: CPT

## 2025-01-07 PROCEDURE — 36415 COLL VENOUS BLD VENIPUNCTURE: CPT

## 2025-01-07 PROCEDURE — 86780 TREPONEMA PALLIDUM: CPT

## 2025-01-07 NOTE — PROGRESS NOTES
[facility] OB OFFICE VISIT NOTE     Primary PCP: None    Chief Complaint     Joana Thomas is a 39 y.o. female   at 15w3d, JEANNETTE 2025, by Last Menstrual Period presenting to [facility] for initial OB visit.    HPI: Lives in a group home, and does not have  custody of her other children. Was recently at an IP rehab and transition to OP rehab where she was being prescribed Suboxone. Per patient, she stopped attending and the program stopped refilling her prescriptions. Per patient, has been sober for 79 days. Reports that she is still smoking 4 cigarettes a day as well as vaping.     Relevant PMH:    - ADD/ADHD -- No currently taking medication  - Depression/Anxiety -- Xanax and Seroquel   - HSV -- No current oral or genital lesions, per patient  - History of Hep B/Hep C infection  - History of polysubstance abuse -- Suboxone use  Obstetrics History     OB History    Para Term  AB Living   8 4 2 2 3 4   SAB IAB Ectopic Multiple Live Births   2 1 0 0 4      # Outcome Date GA Lbr Abad/2nd Weight Sex Type Anes PTL Lv   8 Current            7 SAB 2024 4w0d    SAB   FD   6 Term 21 40w0d  2.722 kg (6 lb) M Vag-Spont EPI N ROEL   5 IAB  12w0d    TAB   FD      Complications: History of dilatation and curettage   4 2018 8w0d    SAB   FD   3  16 28w0d  1.361 kg (3 lb) F Vag-Spont None Y ROEL   2  10/21/15 28w5d  1.49 kg (3 lb 4.6 oz) M Vag-Spont None Y ROEL   1 Term 09 40w0d  2.92 kg (6 lb 7 oz) M Vag-Spont EPI N ROEL      Gynecology History   - LMP: 26  - Menstrual hx: regular, 5 days per period  - History of STDs and/or Abnormal PAPs: Denies  - History of prior : No    Medical History    Past Medical History: As above in HPI  Surgical History:   Family History: Reports no pertinent FHx  Social History: Smoking 4 cigarettes per day, vaping. Denies alcohol and other illicit drug use  Medications:   Current Outpatient Medications   Medication Sig     "ALPRAZolam (XANAX) 2 MG Tab Take 0.5 tablets by mouth 2 (two) times daily as needed for Anxiety.    FEROSUL 325 mg (65 mg iron) Tab tablet Take 1 tablet by mouth 2 (two) times daily.    QUEtiapine (SEROQUEL) 100 MG Tab Take 100 mg by mouth every evening.    SUBOXONE 8-2 mg Place 2 Film under the tongue once daily.     No current facility-administered medications for this visit.       Review of Systems   Review of Systems   Constitutional:  Negative for activity change and appetite change.   HENT:  Negative for congestion and sore throat.    Eyes:  Negative for visual disturbance.   Respiratory:  Negative for cough, chest tightness and shortness of breath.    Cardiovascular:  Negative for chest pain.   Gastrointestinal:  Negative for abdominal pain, nausea and vomiting.   Genitourinary:  Negative for dysuria and hematuria.   Skin:  Negative for rash.   Neurological:  Negative for dizziness and weakness.   Psychiatric/Behavioral:  Negative for dysphoric mood.      Antepartum specific   - Fetal movements: No  - Vaginal bleeding: No  - Vaginal discharge: No  - Loss of fluid: No  - Contractions: No  - Headaches: No  - Vision changes: No  - Edema: No    Vital Signs     Vitals:    01/07/25 1205   BP: 124/67   BP Location: Right arm   Patient Position: Sitting   Pulse: 68   Resp: 18   Temp: 98.4 °F (36.9 °C)   TempSrc: Oral   SpO2: 100%   Weight: 68.2 kg (150 lb 6.4 oz)   Height: 5' 2" (1.575 m)     Physical Exam   General: in no acute distress  RESP: clear to auscultation bilaterally, non labored  CV: regular rate and rhythm, no murmurs, no edema  ABD: non-tender, BS+  FHTs: 135 bpm via Doppler  Fundal height: Below level of umbilicus    Labs   Urine dipstick:   Lab Results   Component Value Date    COLORU Yellow 01/07/2025    SPECGRAV 1.025 01/07/2025    PHUR 6.5 01/07/2025    WBCUR trace 01/07/2025    NITRITE neg 01/07/2025    PROTEINPOC neg 01/07/2025    GLUCOSEUR neg 01/07/2025    KETONESU neg 01/07/2025    " UROBILINOGEN 0.2 2025    BILIRUBINPOC neg 2025    RBCUR neg 2025       Initial OB Labs: (ordered 25, except GC/Chlamydia and Pap. Needs to be completed at next visit)  - Blood Type and Rh: O+  - Antibody Screen: Negative  - CBC H/H: 12.9/38.1  - BUN/Cr: 9.1/0.60  - HIV:   - RPR:   - GC:   - CT:   - HBsAg:    - HCVAb:   - Rubella:   - Varicella:   - UA & Culture:   - Sickle Cell Screen:    - PAP:   - Influenza vaccine date: Informed refusal   - Previous  (N/A if not applicable): N/A  - BTL desired (N/A if not applicable):     15-20 Weeks Lab: (collected 25)  - Quad Screen:     28 Week Labs:   - 1H GTT:   - Rhogam (N/A if not applicable):   - Date of Tdap:   - CBC H/H:   - RPR:   -  Consent Form Signed (N/A if not applicable):   - BTL consent:   - [facility] [place] care:    37 Week Labs:  - CBC H/H:   - RPR:   - GBS Culture:    - HIV:   - Cervical GC:   - Cervical Exam:     38 Weeks:  - Cervical Exam:     39 Weeks:  - Cervical Exam:  - NST Exam:    FM Continuity Patient: no  Previous : no    Scheduled delivery: induction or  (can NOT be scheduled any earlier than 39w0d):    Imaging    Initial US:   - 12/3/24: Single live intrauterine pregnancy with ultrasound age by crown-rump length 9 weeks 4 days. Detailed fetal anatomic survey is recommended as an outpatient under OB supervision at 18-20 weeks gestation.      - 25: Single live intrauterine pregnancy with average ultrasound age 14 weeks 5 days.  Low lying placenta.    Anatomy Scan:    Assessment     1. 15 weeks gestation of pregnancy    2. Polysubstance abuse    3. Suboxone maintenance treatment complicating pregnancy, antepartum, second trimester    4. History of hepatitis B    5. History of hepatitis C    6. HSV infection    7. Low-lying placenta      Plan     15 weeks gestation of pregnancy  - OB Protocol discussed  - PNVs  - Urine dip reviewed as above  - Routine labs: ordered as above, except  GC/Chlamydia and Pap (Will need to be completed at next visit in 2 weeks). Patient running late for next appointment at 12:30   - Mother plans to bottle feed  - Postpartum contraception discussion: Undecided  - ED precautions discussed: vaginal bleeding or leaking fluid, belly cramping or pain, SOB/chest pain, swelling of the face/lower extremities, vision changes. If don't feel the baby move in over an hour. Severe headache that are not resolved with medication     Suboxone maintenance treatment complicated pregnancy antepartum, second trimester   Polysubstance abuse  - UDA negative 1/7/25  - Patient reporting needing assistance in getting into program after being released from previous OP program  - Clinic  Eli Ram consulted and provided patient with resources. Will continue to contact throughout pregnancy for associated social needs and resources   - Referred to MFM. Will follow recommendations    History of Hepatitis C  History of Hepatitis B  HSV infection  - No known treatment for Hep C   - No current oral or genital lesions   - Referred to MFM. Will follow recommendations    Low lying placenta  - Noted on US 1/7/25  - Referred to MFM. Will follow recommendations        Return to clinic in 2 weeks for routine OB clinic visit.       Angelique Salcedo DO  [facility]

## 2025-01-07 NOTE — PATIENT INSTRUCTIONS
Well Child Exam    About this topic  A well child exam is a visit with your child's doctor to check your child's health. The doctor will check your child's growth, progress, and shot record. It is also a time for you to ask your child's doctor any questions you have about your child's health. Your child will have a full exam during the office visit. Other things that are sometimes checked are hearing, eyesight, and urine or blood tests. The doctor may give shots during your child's well visit.    General    Getting Ready for a Well Child Exam    A well child exam is a good time for you to talk with your child's doctor about any of these topics:    Eating habits or diet    How your child acts    Sleep issues    Growth    Safety    Vaccines    Toilet training    Teen years    How your child is doing in school or any learning concerns    Home life    You may want to make a written list of the things you want to talk about with your child's doctor. Be sure to bring your list of questions to your child's well visit. You may also want to do some research on your own before your office visit by reading books or looking at Web sites. Other family members, child caregivers, and grandparents may be able to help you too. Your child's doctor may ask also you about your family's health history or if your child is around anyone who smokes.    The Exam    The doctor measures your child's weight, height, and sometimes head size or body mass index (BMI). The doctor plots these numbers on a growth curve. The growth curve gives a picture of your baby's growth at each visit. The doctor may check your child's temperature, blood pressure, breathing, and heart rate. The doctor may listen to your child's heart, lungs, and belly. Your doctor will do a full exam of your child from the head to the toes.    Growth and Development Questions    Your doctor will ask you about your child's progress. The doctor will focus on the skills that are  likely to happen at your child's age. Some of these are motor skills like rolling over, walking, and running, while others are social skills, or how your child interacts with other people. Your child's doctor will also ask you how your child is doing in school.    Help for Parents    Your doctor will talk with you about any concerns you have about your child during this visit. The doctor may also talk with you about:    Getting family help or other support    Ways to help your child's brain growth    How your child plays and acts with others    Ways to help your child exercise    Safety    Eating habits    Vaccines    Quitting smoking    Help if you have a low mood after having a baby    Shots or Vaccines    It is important for your child to get shots on time. This protects from very serious illnesses like pertussis, measles, or some kinds of pneumonia. Sometimes, your child may need more than one dose of vaccine. The vaccines used today are safer than ever. Talk to your doctor if you have any questions or concerns about giving your child vaccines.    Well Child Exam Schedule    The American Academy of Pediatrics (AAP) suggests this plan for well child visits:    [place] (3 to 5 days old)    1 month old    2 months old    4 months old    6 months old    9 months old    12 months old    15 months old    18 months old    2 years old    30 months old    3 years old    4 years old    Once each year until age 21    Well child exams are very important. Since your child is healthy at this visit and it is scheduled ahead of time, you can think about things you want to ask your child's doctor. Be sure to follow the above plan for well child visits as well as any other visits your child's doctor suggests.    Where can I learn more?    Centers for Disease Control and Prevention    http://www.cdc.gov/vaccines     Healthy  Children    https://www.healthychildren.org/English/family-life/health-management/Pages/Well-Child-Care-A-Check-Up-for-Success.aspx    Disclaimer.  This generalized information is a limited summary of diagnosis, treatment, and/or medication information. It is not meant to be comprehensive and should be used as a tool to help the user understand and/or assess potential diagnostic and treatment options. It does NOT include all information about conditions, treatments, medications, side effects, or risks that may apply to a specific patient. It is not intended to be medical advice or a substitute for the medical advice, diagnosis, or treatment of a health care provider based on the health care provider's examination and assessment of a patients specific and unique circumstances. Patients must speak with a health care provider for complete information about their health, medical questions, and treatment options, including any risks or benefits regarding use of medications. This information does not endorse any treatments or medications as safe, effective, or approved for treating a specific patient. UpTo[employer], Inc[employer] and its affiliates disclaim any warranty or liability relating to this information or the use thereof. The use of this information is governed by the Terms of Use, available at Terms of Use. ©2022 UpToDate, Inc. and its affiliates and/or licensors. All rights reserved.

## 2025-01-08 DIAGNOSIS — Z3A.15 15 WEEKS GESTATION OF PREGNANCY: Primary | ICD-10-CM

## 2025-01-08 LAB
# FETUSES: 1
2ND TRIMESTER 4 SCREEN SERPL-IMP: ABNORMAL
AFP ADJ MOM SERPL: 0.51 MOM
AFP SERPL IA-MCNC: 16.1 NG/ML
AGE AT DELIVERY: ABNORMAL
B-HCG ADJ MOM SERPL: 1.33 MOM
CHORION TYPE: ABNORMAL
COLLECT DATE: ABNORMAL
CURRENT SMOKER: ABNORMAL
FET TS 21 RISK FROM MAT AGE: ABNORMAL
GA EST FROM LMP: ABNORMAL WK,D
GA METHOD: ABNORMAL
HBV SURFACE AG SERPL QL IA: NONREACTIVE
HCG SERPL IA-ACNC: 40.5 IU/ML
HGB S BLD QL SOLY: NEGATIVE
HX OF NTD QL: NO
HX OF NTD QL: NO
HX OF TRISOMY 21 QL: NO
IDDM PATIENT QL: NO
INHIBIN A ADJ MOM SERPL: 1.35 MOM
INHIBIN SERPL-MCNC: 305 PG/ML
IVF PREGNANCY: NO
LABORATORY COMMENT REPORT: ABNORMAL
M PHYSICIAN PHONE NUMBER: ABNORMAL
MATERNAL RISK FACTORS: ABNORMAL
NEURAL TUBE DEFECT RISK FETUS: ABNORMAL %
RECOM F/U: ABNORMAL
RUBV IGG SERPL IA-ACNC: 2.6
RUBV IGG SERPL QL IA: POSITIVE
TEST PERFORMANCE INFO SPEC: ABNORMAL
TS 18 RISK FETUS: ABNORMAL
TS 21 RISK FETUS: ABNORMAL
U ESTRIOL ADJ MOM SERPL: 0.8 MOM
U ESTRIOL SERPL-MCNC: 0.53 NG/ML
VZV IGG SER IA-ACNC: 1.8
VZV IGG SER QL IA: POSITIVE

## 2025-01-09 ENCOUNTER — TELEPHONE (OUTPATIENT)
Dept: OBGYN | Facility: CLINIC | Age: 40
End: 2025-01-09
Payer: MEDICAID

## 2025-01-09 LAB — BACTERIA UR CULT: NORMAL

## 2025-01-09 NOTE — TELEPHONE ENCOUNTER
Contacted patient via telephone.  confirmed. Called patient back following concern over lab results and prescriptions. PNVs not sent to patients pharmacy at visit. PNVs sent at this time to preferred pharmacy. Asked about getting Suboxone prescription for 1 week. Discussed with patient that she needs to contct KIT[facility] about getting into program as this is not something that we prescribe through our clinic. Discussed results of quad screen and informed patient that NIPT can be performed with MFM. Patient concerned about baby and informed that it just states the risk and not a definitive test. Voiced understanding and stated she will attend her MFM appointment once they reach out to her. No other questions or concerns.

## 2025-01-28 ENCOUNTER — OFFICE VISIT (OUTPATIENT)
Dept: MATERNAL FETAL MEDICINE | Facility: CLINIC | Age: 40
End: 2025-01-28
Payer: MEDICAID

## 2025-01-28 ENCOUNTER — PROCEDURE VISIT (OUTPATIENT)
Dept: MATERNAL FETAL MEDICINE | Facility: CLINIC | Age: 40
End: 2025-01-28
Payer: MEDICAID

## 2025-01-28 VITALS
HEIGHT: 62 IN | SYSTOLIC BLOOD PRESSURE: 119 MMHG | WEIGHT: 141.13 LBS | DIASTOLIC BLOOD PRESSURE: 74 MMHG | BODY MASS INDEX: 25.97 KG/M2 | HEART RATE: 75 BPM

## 2025-01-28 DIAGNOSIS — F19.21 HISTORY OF DRUG DEPENDENCE: ICD-10-CM

## 2025-01-28 DIAGNOSIS — O09.899 HISTORY OF PRETERM DELIVERY, CURRENTLY PREGNANT: ICD-10-CM

## 2025-01-28 DIAGNOSIS — O98.419 HEPATITIS C VIRUS INFECTION IN MOTHER DURING PREGNANCY: ICD-10-CM

## 2025-01-28 DIAGNOSIS — O28.0 ABNORMAL QUAD SCREEN: ICD-10-CM

## 2025-01-28 DIAGNOSIS — Z86.39 HISTORY OF THYROID DISORDER: ICD-10-CM

## 2025-01-28 DIAGNOSIS — B19.20 HEPATITIS C VIRUS INFECTION IN MOTHER DURING PREGNANCY: ICD-10-CM

## 2025-01-28 DIAGNOSIS — Z3A.15 15 WEEKS GESTATION OF PREGNANCY: ICD-10-CM

## 2025-01-28 DIAGNOSIS — O99.342 MENTAL DISORDER AFFECTING PREGNANCY IN SECOND TRIMESTER: ICD-10-CM

## 2025-01-28 DIAGNOSIS — O09.522 MULTIGRAVIDA OF ADVANCED MATERNAL AGE IN SECOND TRIMESTER: Primary | ICD-10-CM

## 2025-01-28 DIAGNOSIS — O99.332 TOBACCO SMOKING AFFECTING PREGNANCY IN SECOND TRIMESTER: ICD-10-CM

## 2025-01-28 DIAGNOSIS — I33.0 ACUTE BACTERIAL ENDOCARDITIS: ICD-10-CM

## 2025-01-28 DIAGNOSIS — F10.10 ALCOHOL ABUSE AFFECTING PREGNANCY IN SECOND TRIMESTER: ICD-10-CM

## 2025-01-28 DIAGNOSIS — O99.312 ALCOHOL ABUSE AFFECTING PREGNANCY IN SECOND TRIMESTER: ICD-10-CM

## 2025-01-28 PROBLEM — K61.0 PERIANAL ABSCESS: Status: RESOLVED | Noted: 2022-09-10 | Resolved: 2025-01-28

## 2025-01-28 PROBLEM — E87.8 ELECTROLYTE ABNORMALITY: Status: RESOLVED | Noted: 2022-09-09 | Resolved: 2025-01-28

## 2025-01-28 PROCEDURE — 99205 OFFICE O/P NEW HI 60 MIN: CPT | Mod: TH,S$GLB,, | Performed by: OBSTETRICS & GYNECOLOGY

## 2025-01-28 PROCEDURE — 3078F DIAST BP <80 MM HG: CPT | Mod: CPTII,S$GLB,, | Performed by: OBSTETRICS & GYNECOLOGY

## 2025-01-28 PROCEDURE — 3008F BODY MASS INDEX DOCD: CPT | Mod: CPTII,S$GLB,, | Performed by: OBSTETRICS & GYNECOLOGY

## 2025-01-28 PROCEDURE — 1160F RVW MEDS BY RX/DR IN RCRD: CPT | Mod: CPTII,S$GLB,, | Performed by: OBSTETRICS & GYNECOLOGY

## 2025-01-28 PROCEDURE — 76817 TRANSVAGINAL US OBSTETRIC: CPT | Mod: S$GLB,,, | Performed by: OBSTETRICS & GYNECOLOGY

## 2025-01-28 PROCEDURE — 1159F MED LIST DOCD IN RCRD: CPT | Mod: CPTII,S$GLB,, | Performed by: OBSTETRICS & GYNECOLOGY

## 2025-01-28 PROCEDURE — 3074F SYST BP LT 130 MM HG: CPT | Mod: CPTII,S$GLB,, | Performed by: OBSTETRICS & GYNECOLOGY

## 2025-01-28 PROCEDURE — 76811 OB US DETAILED SNGL FETUS: CPT | Mod: S$GLB,,, | Performed by: OBSTETRICS & GYNECOLOGY

## 2025-01-28 RX ORDER — B-COMPLEX WITH VITAMIN C
1 TABLET ORAL DAILY
Qty: 30 TABLET | Refills: 6 | Status: SHIPPED | OUTPATIENT
Start: 2025-01-28

## 2025-01-28 RX ORDER — PROGESTERONE 200 MG/1
200 CAPSULE ORAL NIGHTLY
Qty: 30 CAPSULE | Refills: 4 | Status: SHIPPED | OUTPATIENT
Start: 2025-01-28

## 2025-01-28 RX ORDER — QUETIAPINE FUMARATE 300 MG/1
300 TABLET, FILM COATED ORAL NIGHTLY
COMMUNITY
Start: 2025-01-27

## 2025-01-28 NOTE — ASSESSMENT & PLAN NOTE
Today the patient was counseled on the relationship between maternal age and  aneuploidy. The patient was counseled about the increased risk of Down Syndrome noted on her aneuploidy screening. The patients analytes showed a 1:30 risk; age related risk- 1:140    We discussed that Down Syndrome, also known as Trisomy 21 is a condition where an individual has three copies of chromosome 21, rather than having two copies. Down syndrome has an incidence of approximately 1 in 660 newborns. We briefly discussed the potential physical and mental findings associated with this condition, as well as developmental delay.     We discussed the limitations of ultrasound in diagnosing Down Syndrome, specifically that ultrasound only detects 50-60% of fetuses with Down Syndrome. We discussed amniocentesis as being a diagnostic test and reviewed the risks, benefits, alternatives, and limitations of this procedure.  I quoted the patient a 1 in 1000 procedure related risk of fetal loss with genetic amniocentesis.   I also discussed with the patient the option of NIPT (Materni T-21) along with the sensitivity and specificity of the test.  We discussed specifically that NIPT is another screening test. Per ACOG, Because cell free DNA is a screening test with the potential for false-positive and false-negative results, such testing should not be used as a substitute for diagnostic testing.    The patient has elected to proceed with NIPT - drawn in clinic today.

## 2025-01-28 NOTE — ASSESSMENT & PLAN NOTE
I counseled the patient regarding the risks of hepatitis C in pregnancy. In general, women chronically infected with hepatitis C have an uneventful pregnancy. In HIV-negative patients, the risk of vertical transmission is approximately 5%. The risk of vertical transmission increases in the presence of HIV co-infection. Pregnancy complications can include fetal growth restriction and low birthweight. Treatment with antiviral medications is not recommended during pregnancy.    She began treatment for Hep C while she was admitted to inpatient rehab in Oct 2024. At that time, she did not know she was pregnant. When she was discharged, she was provided the remaining medication to complete treatment course, however, she discovered she was pregnant and did not take the medication as she was unsure if it was safe in pregnancy. Recommend holding treatment during pregnancy. Recommend repeat evaluation w/ heptaology or GI for completion of treatment postpartum.    Recommendations:  -Screen for concurrent HIV infection, hepatitis A and hepatitis B infection, and other sexually transmitted diseases (syphilis, gonorrhea, and chlamydia) - completed  -Vaccinate for hepatitis A and hepatitis B if non-immune  -Obtain baseline LFTs and hepatitis C viral load (to be ordered by primary OB provider)  -Refer to hepatology for evaluation and consideration of treatment completion postpartum    -Fetal growth ultrasound at 28 and 34 weeks  - delivery reserved for usual obstetric indications.  -Avoid invasive fetal procedures intrapartum (early amniotomy, fetal scalp electrode, and episiotomy) unless necessary.  -Breastfeeding is not contraindicated (recommend abstain from breastfeeding if nipples are bleeding or cracked).  -Evaluation of  by pediatricians after delivery.

## 2025-01-28 NOTE — ASSESSMENT & PLAN NOTE
"Remote history of bacterial endocarditis (2022). She reports receiving IV antibiotics for a "long time". She admits to using, then. Asymptomatic currently.    Recommend echo now that she's pregnant.  "

## 2025-01-28 NOTE — ASSESSMENT & PLAN NOTE
Today I counseled the patient on the relationship between maternal age and fetal aneuploidy.  We discussed the risks and benefits of screening tests versus definitive genetic testing (amniocentesis). She was counseled about her specific age-related risk of fetal aneuploidy. We discussed the limitations of ultrasound in the definitive diagnosis of fetal aneuploidy.  I quoted the patient a 1 in 900 procedure related risk of fetal loss with genetic amniocentesis.  We also discussed cell free fetal DNA screening including the sensitivity and specificity of the test.  Her questions were answered and after today's consultation she did not want to pursue amniocentesis.    She had a positive quad screen (increased risk for T21 1:30 - age related risk 1:140).She did want to pursue NIPT- drawn in clinic today. Please see separate documentation.  Additionally, we discussed the association between advanced maternal age (AMA) and preeclampsia, gestational diabetes, and fetal growth restriction.    Recommendations:  Detailed anatomic survey at 19-20 weeks   Follow-up fetal ultrasound at 32-34 weeks for interval fetal growth  Consider low dose aspirin at 12-16 weeks for preeclampsia risk reduction

## 2025-01-28 NOTE — ASSESSMENT & PLAN NOTE
Long hx IV heroin and meth, THC. She has attended inpatient rehab multiple times over the years. She was recently sober from March - October 2024. Relapsed in October. She was also drinking approx 4 pints/day at that time. Went to inpatient rehab. Current sobriety date: Oct 16, 2024. She stayed inpatient x 30d then IOP. Currently living in sober house. Involved w/ AA.    Buprenorphine has been increasingly used for opioid addiction during pregnancy. A ceiling effect for the benefit of this medication is thought to exist, and additional dosing beyond 24 to 32mg/day may no achieve any additional benefits. Its benefits include decreased drug craving with daily dosing. It can only be prescribed by specifically certified physicians which improves patient autonomy and broader availability of opiate maintenance.   Though still possible, the risk of JOSE LUIS in infants born from pregnancies exposed to buprenorphine is less severe than those pregnancies exposed to methadone.    1/28/25- Recent negative UDS. We have applauded her efforts at continued sobriety. Currently taking 1 strip (8mg) of Suboxone QAM. [facility], however, states she only needs it once daily. Requests to attend a detox facility so she can stop Suboxone altogether as she has a sober living group home option ([facility]) that does not allow PO Suboxone or Subutex. Do not recommend detox facility/discontinuation of Suboxone in pregnancy. She states there is an option for once monthly injections that is acceptable by this group home. Ok with this plan - will be dispensed by [facility].    Recommendations:  Continue care w/ MAT clinic  Currently on Suboxone 8 mg per day, increase as needed  Targeted ultrasound at 18 weeks scheduled  Serial growth ultrasounds  Urine toxicology PRN for accountability (to be ordered by primary OB)   Management of intrapartum and postpartum pain can be challenging so consider a consultation with  anesthesiology. In general, women should be continued on their opioid agonist medication intrapartum. Epidural or spinal anesthesia should be offered. Butrophanol, nalbuphine, and pentazocine should be avoided as they can precipitate withdrawal. Additionally, patients who take burprenorphine will require higher doses of opioids to achieve analgesia due to tolerance. The use of ketorolac postpartum can be highly effective for adjunctive pain control.  Notification of NBSCU at time of delivery  Social work consult placed

## 2025-01-28 NOTE — ASSESSMENT & PLAN NOTE
She has a history of sPTB at 28 weeks gestation x 2.   In patients with a history of spontaneous  delivery prior to 37 weeks gestation, [facility] currently recommends consideration of the use of progesterone therapy for the prevention of recurrent  birth.  Neosho and its generic formulations (17-OHP) have been recently pulled from the market, therefore, we no longer recommend its usage for the prevention of  birth.  There is increasing evidence supporting the use of alternative forms of progesterone supplementation (ie vaginal progesterone 200 mg qhs) for women have been thoroughly counseled regarding the benefits, risks, costs, and logistics of each form of progesterone supplementation.  We also discussed recommendations for cervical length monitoring biweekly from 16 weeks until 22 weeks 6 days gestation as this may also help identify patients in whom cerclage can be considered to reduce the risk of  birth as well.     25- CL 27-29mm with slight beaking at internal os. Recommend vaginal progesterone QHS and repeat CL in 1 week.    Recommendations   Consider alternative supplementation with vaginal progesterone 200 mg qhs   Start cervical length surveillance every 2 weeks, starting at 16 weeks gestation and continuing until 22 weeks 6 days gestation  If the cervix measures <30 mm, perform weekly cervical length   If the cervical length is <25 mm, cerclage should be offered in women with a prior  birth

## 2025-01-28 NOTE — ASSESSMENT & PLAN NOTE
She reports a history of PTSD, psychosis, bipolar, and traumatic abuse. The severity of her psychiatric illness was partly due to her history of substance use. She was taking Vraylar and Seroquel prior to pregnancy. She was instructed to discontinue these meds in pregnancy. Not on any meds currently but would like to restart Vraylar as this helped her symptoms significantly. Discussed increased risk for peripartum and postpartum mood disorders. Precautions provided.      We discussed the minimal studies of Vraylar in pregnancy. She understands and would like to restart. Ok to restart.    It is important to optimize mental health conditions during pregnancy in an effort to reduce the risk of postpartum mood disorders. There are options for management including psychotherapy, counseling, cognitive behavioral therapy, exercise/yoga, journaling, and psychiatry services.

## 2025-01-28 NOTE — ASSESSMENT & PLAN NOTE
Patient counseled on cessation of tobacco use and avoidance of second hand smoke inhalation for duration of the pregnancy and postpartum period secondary to the associated fetal/ risks that include the following: spontaneous , placental abruption, low birth weight, oligohydramnios, non-reassuring fetal status, and sudden infant death syndrome (SIDS).  Additionally, there are reports of significant health complications with vaping.     She reports decreasing amt of cigarette usage and vaping.

## 2025-01-28 NOTE — ASSESSMENT & PLAN NOTE
She reports a remote history of abnormal thyroid labs. Denies need for med mgmt. Unable to recall if hyper or hypothyroidism.     Recommend TFTs. Further recs will be provided if abnormal. During pregnancy, recommend TSH <2.5
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
## 2025-01-30 DIAGNOSIS — O09.522 MULTIGRAVIDA OF ADVANCED MATERNAL AGE IN SECOND TRIMESTER: Primary | ICD-10-CM

## 2025-01-30 DIAGNOSIS — O09.899 HISTORY OF PRETERM DELIVERY, CURRENTLY PREGNANT: ICD-10-CM

## 2025-01-30 DIAGNOSIS — O28.0 ABNORMAL QUAD SCREEN: ICD-10-CM

## 2025-02-25 ENCOUNTER — PROCEDURE VISIT (OUTPATIENT)
Dept: MATERNAL FETAL MEDICINE | Facility: CLINIC | Age: 40
End: 2025-02-25
Payer: MEDICAID

## 2025-02-25 ENCOUNTER — DOCUMENTATION ONLY (OUTPATIENT)
Dept: MATERNAL FETAL MEDICINE | Facility: CLINIC | Age: 40
End: 2025-02-25
Payer: MEDICAID

## 2025-02-25 DIAGNOSIS — Z36.89 ENCOUNTER FOR ULTRASOUND TO ASSESS FETAL GROWTH: ICD-10-CM

## 2025-02-25 DIAGNOSIS — O99.342 MENTAL DISORDER AFFECTING PREGNANCY IN SECOND TRIMESTER: ICD-10-CM

## 2025-02-25 DIAGNOSIS — O98.419 HEPATITIS C VIRUS INFECTION IN MOTHER DURING PREGNANCY: ICD-10-CM

## 2025-02-25 DIAGNOSIS — O09.522 MULTIGRAVIDA OF ADVANCED MATERNAL AGE IN SECOND TRIMESTER: ICD-10-CM

## 2025-02-25 DIAGNOSIS — O99.332 TOBACCO SMOKING AFFECTING PREGNANCY IN SECOND TRIMESTER: ICD-10-CM

## 2025-02-25 DIAGNOSIS — B19.20 HEPATITIS C VIRUS INFECTION IN MOTHER DURING PREGNANCY: ICD-10-CM

## 2025-02-25 NOTE — PROGRESS NOTES
Pt arrived approx 1 hr early to appointment time. Pt was unable to wait to be called back for appt and stated she needed to leave. Advised pt she will be called to be r/s.

## 2025-03-06 DIAGNOSIS — Z36.89 ENCOUNTER FOR ULTRASOUND TO ASSESS FETAL GROWTH: ICD-10-CM

## 2025-03-06 DIAGNOSIS — O09.522 MULTIGRAVIDA OF ADVANCED MATERNAL AGE IN SECOND TRIMESTER: Primary | ICD-10-CM

## 2025-03-06 DIAGNOSIS — O98.419 HEPATITIS C VIRUS INFECTION IN MOTHER DURING PREGNANCY: ICD-10-CM

## 2025-03-06 DIAGNOSIS — B19.20 HEPATITIS C VIRUS INFECTION IN MOTHER DURING PREGNANCY: ICD-10-CM

## 2025-04-23 ENCOUNTER — PATIENT MESSAGE (OUTPATIENT)
Dept: OBSTETRICS AND GYNECOLOGY | Facility: CLINIC | Age: 40
End: 2025-04-23

## 2025-04-23 ENCOUNTER — CLINICAL SUPPORT (OUTPATIENT)
Dept: OBSTETRICS AND GYNECOLOGY | Facility: CLINIC | Age: 40
End: 2025-04-23
Payer: MEDICAID

## 2025-04-23 DIAGNOSIS — N91.2 ABSENT MENSES: Primary | ICD-10-CM

## 2025-04-23 PROCEDURE — 99999 PR PBB SHADOW E&M-EST. PATIENT-LVL III: CPT | Mod: PBBFAC,,,

## 2025-04-23 PROCEDURE — 99213 OFFICE O/P EST LOW 20 MIN: CPT | Mod: PBBFAC

## 2025-04-23 RX ORDER — DEXTROAMPHETAMINE SACCHARATE, AMPHETAMINE ASPARTATE, DEXTROAMPHETAMINE SULFATE AND AMPHETAMINE SULFATE 3.125; 3.125; 3.125; 3.125 MG/1; MG/1; MG/1; MG/1
30 TABLET ORAL DAILY
COMMUNITY

## 2025-04-23 RX ORDER — ONDANSETRON 4 MG/1
4 TABLET, FILM COATED ORAL EVERY 8 HOURS PRN
COMMUNITY

## 2025-04-23 RX ORDER — GABAPENTIN 800 MG/1
800 TABLET ORAL 2 TIMES DAILY
COMMUNITY

## 2025-04-23 NOTE — PROGRESS NOTES
Spoke with patient for approximately 30 minutes during OB navigator virtual visit.  Updated chart to reflect up to date patient demographics.  Allergies, medications, pharmacy, medical, surgical and family history updated. Substance and sexual activity, marital status, gender identity and OB/Gyn history updated.   Patient was guided through expectations of care during pregnancy. NP transfer at 30 week 4 days to Dr Katz appt scheduled.  All questions answered. Encouraged to send message or call office with any questions/concerns. Verbalized understanding.     Discussed with pt:    Lmp 9/21/24; currently 30 weeks 4 days; JEANNETTE 6/28/25 but she is saying that the dating US is saying something else. I instructed her that she needs to get her records transferred to us. She verbalized understanding.   Encouraged to begin taking PNV daily  Denies n/v:   common in 1st tri  discussed safe options per Pregnancy A to Z/AWHONN guide  Denies cramping/spotting:   may be normal to have mild cramping/light spotting, andrey in 1st tri & with sexual activity  Precautions/warning signs discussed:   encouraged to go to ED for excessive vag d/c, saturating a pad, bright red bleeding, painful cramping worse than menstrual cramps/abd pain that is interrupting daily activity   Normal weight gain:  1st trimester-may not gain anything at all or up to 5 lbs   Total weight gain approximately 25-35 lbs depending on starting weight   Should avoid alcohol, smoking, vaping, drugs, herbal supplements - she is trying to decrease the amount that she is smoking cigs to 1-2 cigs/week. She is continuing to vape. (Trying to quit one thing at a time). She has been sober since October 2025. She is going to get established with a doctor this Friday to get all of her medications refilled. I notified her to ask them which meds are safe for pregnancy.     Should check with provider regarding safety of any prescription or OTC meds  Avoid ibuprofen (motrin, advil)  & naproxen (aleve)  Ok to take regular strength tylenol for headache/body aches  InBasket message to pt through pt portal with information regarding the [employer] [email or web address] for access to the Pregnancy A to Z guide/AWHONN guide and Prenatal Class schedule. Informed of ConnectedMOM program & encouraged to participate, Get Ready to Meet Your Baby pamphlet, Coffective steven and how to obtain a breast pump through insurance toward end of pregnancy

## 2025-05-02 ENCOUNTER — OFFICE VISIT (OUTPATIENT)
Dept: OBSTETRICS AND GYNECOLOGY | Facility: CLINIC | Age: 40
End: 2025-05-02
Payer: MEDICAID

## 2025-05-02 VITALS
HEART RATE: 74 BPM | WEIGHT: 157.81 LBS | SYSTOLIC BLOOD PRESSURE: 98 MMHG | BODY MASS INDEX: 29.04 KG/M2 | DIASTOLIC BLOOD PRESSURE: 68 MMHG | HEIGHT: 62 IN

## 2025-05-02 DIAGNOSIS — Z34.83 ENCOUNTER FOR SUPERVISION OF OTHER NORMAL PREGNANCY, THIRD TRIMESTER: Primary | ICD-10-CM

## 2025-05-02 DIAGNOSIS — Z36.89 ENCOUNTER FOR ULTRASOUND TO CHECK FETAL GROWTH: ICD-10-CM

## 2025-05-02 DIAGNOSIS — B19.20 HEPATITIS C VIRUS INFECTION IN MOTHER DURING PREGNANCY: ICD-10-CM

## 2025-05-02 DIAGNOSIS — O09.523 MULTIGRAVIDA OF ADVANCED MATERNAL AGE IN THIRD TRIMESTER: ICD-10-CM

## 2025-05-02 DIAGNOSIS — O98.419 HEPATITIS C VIRUS INFECTION IN MOTHER DURING PREGNANCY: ICD-10-CM

## 2025-05-02 PROCEDURE — 99999 PR PBB SHADOW E&M-EST. PATIENT-LVL III: CPT | Mod: PBBFAC,,, | Performed by: OBSTETRICS & GYNECOLOGY

## 2025-05-02 PROCEDURE — 99213 OFFICE O/P EST LOW 20 MIN: CPT | Mod: PBBFAC,TH | Performed by: OBSTETRICS & GYNECOLOGY

## 2025-05-02 RX ORDER — CARIPRAZINE 1.5 MG/1
1.5 CAPSULE, GELATIN COATED ORAL
COMMUNITY

## 2025-05-02 RX ORDER — VALACYCLOVIR HYDROCHLORIDE 1 G/1
1000 TABLET, FILM COATED ORAL 2 TIMES DAILY
COMMUNITY

## 2025-05-02 NOTE — PROGRESS NOTES
"  Subjective:       Patient ID: Joana Thomas is a 39 y.o. female.    Chief Complaint:  Possible Pregnancy (Transfer ob), Constipation, Headache, and Low-back Pain      History of Present Illness  Constipation  Pertinent negatives include no abdominal pain, back pain, diarrhea, difficulty urinating, fever, nausea or vomiting.   Headache   Pertinent negatives include no abdominal pain, back pain, coughing, dizziness, eye pain, fever, hearing loss, nausea, neck pain, numbness, rhinorrhea, sinus pressure, sore throat, tinnitus, vomiting or weakness.   Low-back Pain  Associated symptoms include headaches. Pertinent negatives include no abdominal pain, arthralgias, chest pain, chills, congestion, coughing, diaphoresis, fatigue, fever, joint swelling, myalgias, nausea, neck pain, numbness, rash, sore throat, vomiting or weakness.     Pt is a  @ 31 6/7 WGA who presents as a transfer of OB care.   Prenatal records available and reviewed in EPIC.  No OB complaints today. Good fetal movement.H/o Hepatitis C; VL 541k. Normal LFTs    Patient reports that all prior children have been adopted. She struggled with addiction in the past, but has been sober through this pregnancy.  Reports that she found out that she was pregnant while in a rehabilitation program.  She has been following with [facility], on monitored therapy, and in group counseling/therapy.  She is tearful because she wants to make sure she is doing everything "right" to be able to keep and care for this baby.     GYN & OB History  Patient's last menstrual period was 2024 (exact date).   Date of Last Pap: No result found    OB History    Para Term  AB Living   8 4 2 2 3 4   SAB IAB Ectopic Multiple Live Births   2 1 0 0 4      # Outcome Date GA Lbr Abad/2nd Weight Sex Type Anes PTL Lv   8 Current            7 SAB 2024 4w0d    SAB   FD   6 Term 21 40w0d  2.722 kg (6 lb) M Vag-Spont EPI N ROEL   5 IAB  12w0d    " TAB   FD      Complications: History of dilatation and curettage   4 2018 8w0d    SAB   FD   3  16 28w0d  1.361 kg (3 lb) F Vag-Spont None Y ROEL   2  10/21/15 28w5d  1.49 kg (3 lb 4.6 oz) M Vag-Spont None Y ROEL   1 Term 09 40w0d  2.92 kg (6 lb 7 oz) M Vag-Spont EPI N ROEL       Review of Systems  Review of Systems   Constitutional:  Negative for chills, diaphoresis, fatigue, fever and unexpected weight change.   HENT:  Negative for congestion, hearing loss, postnasal drip, rhinorrhea, sinus pressure, sinus pain, sore throat and tinnitus.    Eyes:  Negative for pain, discharge and visual disturbance.   Respiratory:  Negative for apnea, cough, shortness of breath and wheezing.    Cardiovascular:  Negative for chest pain, palpitations and leg swelling.   Gastrointestinal:  Positive for constipation. Negative for abdominal pain, diarrhea, nausea and vomiting.   Endocrine: Negative for cold intolerance, heat intolerance, polydipsia, polyphagia and polyuria.   Genitourinary:  Negative for difficulty urinating, dyspareunia, dysuria, enuresis, flank pain, frequency, genital sores, hematuria, menstrual problem, pelvic pain, urgency, vaginal bleeding, vaginal discharge and vaginal pain.   Musculoskeletal:  Negative for arthralgias, back pain, joint swelling, myalgias, neck pain and neck stiffness.   Skin:  Negative for color change, pallor and rash.   Allergic/Immunologic: Negative for environmental allergies, food allergies and immunocompromised state.   Neurological:  Positive for headaches. Negative for dizziness, weakness, light-headedness and numbness.   Hematological:  Negative for adenopathy. Does not bruise/bleed easily.   Psychiatric/Behavioral:  Positive for decreased concentration and sleep disturbance. Negative for agitation and confusion. The patient is nervous/anxious.            Objective:    Physical Exam:   Constitutional: She is oriented to person, place, and time. She appears  well-developed and well-nourished. No distress.    HENT:   Head: Normocephalic and atraumatic.    Eyes: Conjunctivae and EOM are normal.      Pulmonary/Chest: Effort normal. No respiratory distress.        Abdominal:   Gravid: FH 30cm, HFTs 135bpm             Musculoskeletal: Normal range of motion.       Neurological: She is alert and oriented to person, place, and time.    Skin: Skin is warm and dry.    Psychiatric: She has a normal mood and affect. Her behavior is normal. Judgment and thought content normal.          Assessment:        1. Encounter for supervision of other normal pregnancy, third trimester    2. Multigravida of advanced maternal age in third trimester    3. Encounter for ultrasound to check fetal growth    4. - Hepatitis C virus infection in mother during pregnancy               Plan:      Joana was seen today for possible pregnancy, constipation, headache and low-back pain.    Diagnoses and all orders for this visit:    Encounter for supervision of other normal pregnancy, third trimester    Multigravida of advanced maternal age in third trimester  -     US OB/GYN Procedure ([facility]) - Extended List; Future    Encounter for ultrasound to check fetal growth  -     US OB/GYN Procedure ([facility]) - Extended List; Future    - Hepatitis C virus infection in mother during pregnancy    Diagnosis of stable Hepatitis C noted.  Recent LFTs normal.    We have reviewed her medications, and I recommend that she continue her Suboxone through pregnancy/lactation.   We reviewed other medications as follows:  Gabapentin: continue through pregnancy/lactation.  Seroquel/Vraylar: may continue at this time, but we reviewed risk of  withdawal after delivery.  She feels benefits of medication outweigh risks at this time. May continue.   Adderall: discussed that I would recommend that she stop this medication if possible while pregnant secondary to risks usually not outweighing benefits.

## 2025-05-02 NOTE — LETTER
May 2, 2025    Joana Thomas  [address]  [place] [address]              [facility]  [address] [place] [id number]  Phone: [phone]    To Whom It May Concern:    Ms. Thomas is currently under our care for pregnancy.  Estimated Date of Delivery: 25    We have reviewed her medications, and I recommend that she continue her Suboxone through pregnancy/lactation.   We reviewed other medications as follows:  Gabapentin: continue through pregnancy/lactation.  Seroquel/Vraylar: may continue at this time, but we reviewed risk of  withdawal after delivery.  She feels benefits of medication outweigh risks at this time. May continue.   Adderall: discussed that I would recommend that she stop this medication if possible while pregnant secondary to risks.      Sincerely,      Daen Katz MD

## 2025-05-16 ENCOUNTER — PROCEDURE VISIT (OUTPATIENT)
Dept: MATERNAL FETAL MEDICINE | Facility: CLINIC | Age: 40
End: 2025-05-16
Payer: MEDICAID

## 2025-05-16 ENCOUNTER — INITIAL PRENATAL (OUTPATIENT)
Dept: OBSTETRICS AND GYNECOLOGY | Facility: CLINIC | Age: 40
End: 2025-05-16
Payer: MEDICAID

## 2025-05-16 VITALS
BODY MASS INDEX: 30.46 KG/M2 | SYSTOLIC BLOOD PRESSURE: 130 MMHG | DIASTOLIC BLOOD PRESSURE: 84 MMHG | HEART RATE: 103 BPM | WEIGHT: 166.56 LBS

## 2025-05-16 DIAGNOSIS — Z3A.33 33 WEEKS GESTATION OF PREGNANCY: ICD-10-CM

## 2025-05-16 DIAGNOSIS — O26.893 VAGINAL DISCHARGE DURING PREGNANCY IN THIRD TRIMESTER: ICD-10-CM

## 2025-05-16 DIAGNOSIS — Z36.89 ENCOUNTER FOR ULTRASOUND TO CHECK FETAL GROWTH: ICD-10-CM

## 2025-05-16 DIAGNOSIS — N89.8 VAGINAL DISCHARGE DURING PREGNANCY IN THIRD TRIMESTER: ICD-10-CM

## 2025-05-16 DIAGNOSIS — O09.523 MULTIGRAVIDA OF ADVANCED MATERNAL AGE IN THIRD TRIMESTER: ICD-10-CM

## 2025-05-16 DIAGNOSIS — F11.20 PREGNANCY COMPLICATED BY SUBOXONE MAINTENANCE, ANTEPARTUM, THIRD TRIMESTER: ICD-10-CM

## 2025-05-16 DIAGNOSIS — O99.323 PREGNANCY COMPLICATED BY SUBOXONE MAINTENANCE, ANTEPARTUM, THIRD TRIMESTER: ICD-10-CM

## 2025-05-16 DIAGNOSIS — B19.20 HEPATITIS C VIRUS INFECTION IN MOTHER DURING PREGNANCY: ICD-10-CM

## 2025-05-16 DIAGNOSIS — O09.523 MULTIGRAVIDA OF ADVANCED MATERNAL AGE IN THIRD TRIMESTER: Primary | ICD-10-CM

## 2025-05-16 DIAGNOSIS — O98.419 HEPATITIS C VIRUS INFECTION IN MOTHER DURING PREGNANCY: ICD-10-CM

## 2025-05-16 PROCEDURE — 99999 PR PBB SHADOW E&M-EST. PATIENT-LVL II: CPT | Mod: PBBFAC,,,

## 2025-05-16 PROCEDURE — 76816 OB US FOLLOW-UP PER FETUS: CPT | Mod: PBBFAC | Performed by: OBSTETRICS & GYNECOLOGY

## 2025-05-16 PROCEDURE — 99212 OFFICE O/P EST SF 10 MIN: CPT | Mod: PBBFAC,TH
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
## 2025-05-21 ENCOUNTER — RESULTS FOLLOW-UP (OUTPATIENT)
Dept: OBSTETRICS AND GYNECOLOGY | Facility: CLINIC | Age: 40
End: 2025-05-21

## 2025-05-27 ENCOUNTER — TELEPHONE (OUTPATIENT)
Dept: OBSTETRICS AND GYNECOLOGY | Facility: CLINIC | Age: 40
End: 2025-05-27
Payer: MEDICAID

## 2025-05-27 NOTE — TELEPHONE ENCOUNTER
Pt was called and informed ultrasound was performed on 5/16/25 and growth was done and anatomy was completed. Informed pt that she will be seen weekly for visits and she voiced understanding. Pt also asked about her vaginal culture results from last visit. Pt informed results were negative and she voiced understanding.

## 2025-05-27 NOTE — TELEPHONE ENCOUNTER
----- Message from Med Assistant Olsen sent at 5/27/2025 12:37 PM CDT -----  Contact: self  Joana ThomasMRN: [id number] Phone      Not on file.Work Phone      Not on file.Mobile          [phone] Care Team:No, Primary Doctor as PCP - Doug Bains MD as Consulting Physician (Obstetrics and Gynecology)Dean Katz MD as Consulting Physician (Obstetrics and Gynecology)Amy Garcia CNM as Midwife (Obstetrics and Gynecology)OB? Yes, [id number] phone number can you be reached at? [phone]: Has questions regarding ultrasound that was scheduled for Friday 05/30, but was canceled.  Stated is suppose to be getting ultrasound every week.

## 2025-05-30 ENCOUNTER — ROUTINE PRENATAL (OUTPATIENT)
Dept: OBSTETRICS AND GYNECOLOGY | Facility: CLINIC | Age: 40
End: 2025-05-30
Payer: MEDICAID

## 2025-05-30 VITALS
BODY MASS INDEX: 29.89 KG/M2 | HEART RATE: 112 BPM | DIASTOLIC BLOOD PRESSURE: 82 MMHG | WEIGHT: 163.38 LBS | SYSTOLIC BLOOD PRESSURE: 136 MMHG

## 2025-05-30 DIAGNOSIS — Z3A.35 35 WEEKS GESTATION OF PREGNANCY: ICD-10-CM

## 2025-05-30 DIAGNOSIS — Z34.93 ENCOUNTER FOR PREGNANCY RELATED EXAMINATION IN THIRD TRIMESTER: Primary | ICD-10-CM

## 2025-05-30 PROCEDURE — 99999 PR PBB SHADOW E&M-EST. PATIENT-LVL II: CPT | Mod: PBBFAC,,, | Performed by: OBSTETRICS & GYNECOLOGY

## 2025-05-30 PROCEDURE — 99212 OFFICE O/P EST SF 10 MIN: CPT | Mod: PBBFAC,TH | Performed by: OBSTETRICS & GYNECOLOGY

## 2025-05-30 RX ORDER — VALACYCLOVIR HYDROCHLORIDE 1 G/1
1000 TABLET, FILM COATED ORAL DAILY
Qty: 30 TABLET | Refills: 1 | Status: SHIPPED | OUTPATIENT
Start: 2025-05-30

## 2025-05-30 RX ORDER — DEXTROAMPHETAMINE SACCHARATE, AMPHETAMINE ASPARTATE, DEXTROAMPHETAMINE SULFATE AND AMPHETAMINE SULFATE 7.5; 7.5; 7.5; 7.5 MG/1; MG/1; MG/1; MG/1
TABLET ORAL
COMMUNITY
Start: 2025-04-02

## 2025-05-30 NOTE — PROGRESS NOTES
Patient with no complaints. Denies vaginal bleeding or contractions. Good FM. GBS collected today. Consents. Labor precautions discused with patient. RTC in 1 week.     HSV prophylaxis started.    Hep C noted    On suboxone, doing well.  Discussed housing situation.  Safe situation at this time. Resources discussed.     Vitals signs, FHTs, urine dip, and PE findings documented, reviewed and available in OB flow chart.       I spent a total of 20 minutes on the day of the visit.This includes face to face time and non-face to face time preparing to see the patient (eg, review of tests), Obtaining and/or reviewing separately obtained history, Documenting clinical information in the electronic or other health record, Independently interpreting resultsand communicating results to the patient/family/caregiver, or Care coordination.

## 2025-06-06 ENCOUNTER — ROUTINE PRENATAL (OUTPATIENT)
Dept: OBSTETRICS AND GYNECOLOGY | Facility: CLINIC | Age: 40
End: 2025-06-06
Payer: MEDICAID

## 2025-06-06 VITALS
HEART RATE: 74 BPM | DIASTOLIC BLOOD PRESSURE: 84 MMHG | BODY MASS INDEX: 29.8 KG/M2 | WEIGHT: 162.94 LBS | SYSTOLIC BLOOD PRESSURE: 126 MMHG

## 2025-06-06 DIAGNOSIS — O09.523 MULTIGRAVIDA OF ADVANCED MATERNAL AGE IN THIRD TRIMESTER: Primary | ICD-10-CM

## 2025-06-06 DIAGNOSIS — O98.419 HEPATITIS C VIRUS INFECTION IN MOTHER DURING PREGNANCY: ICD-10-CM

## 2025-06-06 DIAGNOSIS — O99.323 PREGNANCY COMPLICATED BY SUBOXONE MAINTENANCE, ANTEPARTUM, THIRD TRIMESTER: ICD-10-CM

## 2025-06-06 DIAGNOSIS — Z34.93 ENCOUNTER FOR PREGNANCY RELATED EXAMINATION IN THIRD TRIMESTER: ICD-10-CM

## 2025-06-06 DIAGNOSIS — B19.20 HEPATITIS C VIRUS INFECTION IN MOTHER DURING PREGNANCY: ICD-10-CM

## 2025-06-06 DIAGNOSIS — Z3A.36 36 WEEKS GESTATION OF PREGNANCY: ICD-10-CM

## 2025-06-06 DIAGNOSIS — F11.20 PREGNANCY COMPLICATED BY SUBOXONE MAINTENANCE, ANTEPARTUM, THIRD TRIMESTER: ICD-10-CM

## 2025-06-06 DIAGNOSIS — Z34.90 ENCOUNTER FOR ELECTIVE INDUCTION OF LABOR: ICD-10-CM

## 2025-06-06 PROCEDURE — 99212 OFFICE O/P EST SF 10 MIN: CPT | Mod: PBBFAC,TH

## 2025-06-06 PROCEDURE — 99999 PR PBB SHADOW E&M-EST. PATIENT-LVL II: CPT | Mod: PBBFAC,,,

## 2025-06-06 RX ORDER — OXYTOCIN-SODIUM CHLORIDE 0.9% IV SOLN 30 UNIT/500ML 30-0.9/5 UT/ML-%
95 SOLUTION INTRAVENOUS ONCE AS NEEDED
OUTPATIENT
Start: 2025-06-06 | End: 2036-11-02

## 2025-06-06 RX ORDER — CLINDAMYCIN PHOSPHATE 900 MG/50ML
900 INJECTION, SOLUTION INTRAVENOUS
OUTPATIENT
Start: 2025-06-06

## 2025-06-06 RX ORDER — MISOPROSTOL 200 UG/1
800 TABLET ORAL ONCE AS NEEDED
OUTPATIENT
Start: 2025-06-06 | End: 2036-11-02

## 2025-06-06 RX ORDER — CALCIUM CARBONATE 200(500)MG
500 TABLET,CHEWABLE ORAL 3 TIMES DAILY PRN
OUTPATIENT
Start: 2025-06-06

## 2025-06-06 RX ORDER — METHYLERGONOVINE MALEATE 0.2 MG/ML
200 INJECTION INTRAVENOUS ONCE AS NEEDED
OUTPATIENT
Start: 2025-06-06 | End: 2036-11-02

## 2025-06-06 RX ORDER — OXYTOCIN-SODIUM CHLORIDE 0.9% IV SOLN 30 UNIT/500ML 30-0.9/5 UT/ML-%
10 SOLUTION INTRAVENOUS ONCE AS NEEDED
OUTPATIENT
Start: 2025-06-06 | End: 2036-11-02

## 2025-06-06 RX ORDER — SODIUM CHLORIDE, SODIUM LACTATE, POTASSIUM CHLORIDE, CALCIUM CHLORIDE 600; 310; 30; 20 MG/100ML; MG/100ML; MG/100ML; MG/100ML
INJECTION, SOLUTION INTRAVENOUS CONTINUOUS
OUTPATIENT
Start: 2025-06-06

## 2025-06-06 RX ORDER — LIDOCAINE HYDROCHLORIDE 10 MG/ML
10 INJECTION, SOLUTION INFILTRATION; PERINEURAL ONCE AS NEEDED
OUTPATIENT
Start: 2025-06-06 | End: 2036-11-02

## 2025-06-06 RX ORDER — MISOPROSTOL 200 UG/1
800 TABLET ORAL ONCE AS NEEDED
OUTPATIENT
Start: 2025-06-06

## 2025-06-06 RX ORDER — OXYTOCIN-SODIUM CHLORIDE 0.9% IV SOLN 30 UNIT/500ML 30-0.9/5 UT/ML-%
30 SOLUTION INTRAVENOUS ONCE AS NEEDED
OUTPATIENT
Start: 2025-06-06 | End: 2036-11-02

## 2025-06-06 RX ORDER — CARBOPROST TROMETHAMINE 250 UG/ML
250 INJECTION, SOLUTION INTRAMUSCULAR
OUTPATIENT
Start: 2025-06-06

## 2025-06-06 RX ORDER — ACETAMINOPHEN 325 MG/1
650 TABLET ORAL EVERY 6 HOURS PRN
OUTPATIENT
Start: 2025-06-06

## 2025-06-06 RX ORDER — OXYTOCIN-SODIUM CHLORIDE 0.9% IV SOLN 30 UNIT/500ML 30-0.9/5 UT/ML-%
0-32 SOLUTION INTRAVENOUS CONTINUOUS
OUTPATIENT
Start: 2025-06-06

## 2025-06-06 RX ORDER — METOCLOPRAMIDE HYDROCHLORIDE 5 MG/ML
5 INJECTION INTRAMUSCULAR; INTRAVENOUS EVERY 6 HOURS PRN
OUTPATIENT
Start: 2025-06-06

## 2025-06-06 RX ORDER — DIPHENOXYLATE HYDROCHLORIDE AND ATROPINE SULFATE 2.5; .025 MG/1; MG/1
2 TABLET ORAL EVERY 6 HOURS PRN
OUTPATIENT
Start: 2025-06-06

## 2025-06-06 RX ORDER — SODIUM CHLORIDE 9 MG/ML
INJECTION, SOLUTION INTRAVENOUS
OUTPATIENT
Start: 2025-06-06

## 2025-06-06 RX ORDER — OXYTOCIN 10 [USP'U]/ML
10 INJECTION, SOLUTION INTRAMUSCULAR; INTRAVENOUS ONCE AS NEEDED
OUTPATIENT
Start: 2025-06-06 | End: 2036-11-02

## 2025-06-06 RX ORDER — MUPIROCIN 20 MG/G
OINTMENT TOPICAL
OUTPATIENT
Start: 2025-06-06

## 2025-06-06 RX ORDER — OXYTOCIN-SODIUM CHLORIDE 0.9% IV SOLN 30 UNIT/500ML 30-0.9/5 UT/ML-%
95 SOLUTION INTRAVENOUS CONTINUOUS PRN
OUTPATIENT
Start: 2025-06-06

## 2025-06-06 RX ORDER — BUTORPHANOL TARTRATE 2 MG/ML
2 INJECTION INTRAMUSCULAR; INTRAVENOUS
OUTPATIENT
Start: 2025-06-06

## 2025-06-06 RX ORDER — ONDANSETRON 8 MG/1
8 TABLET, ORALLY DISINTEGRATING ORAL EVERY 8 HOURS PRN
OUTPATIENT
Start: 2025-06-06

## 2025-06-06 RX ORDER — BUTORPHANOL TARTRATE 1 MG/ML
1 INJECTION INTRAMUSCULAR; INTRAVENOUS
OUTPATIENT
Start: 2025-06-06

## 2025-06-06 RX ORDER — SIMETHICONE 80 MG
1 TABLET,CHEWABLE ORAL 4 TIMES DAILY PRN
OUTPATIENT
Start: 2025-06-06

## 2025-06-13 ENCOUNTER — ROUTINE PRENATAL (OUTPATIENT)
Dept: OBSTETRICS AND GYNECOLOGY | Facility: CLINIC | Age: 40
End: 2025-06-13
Payer: MEDICAID

## 2025-06-13 VITALS
DIASTOLIC BLOOD PRESSURE: 82 MMHG | WEIGHT: 168.38 LBS | SYSTOLIC BLOOD PRESSURE: 124 MMHG | HEART RATE: 113 BPM | BODY MASS INDEX: 30.8 KG/M2

## 2025-06-13 DIAGNOSIS — O98.419 HEPATITIS C VIRUS INFECTION IN MOTHER DURING PREGNANCY: ICD-10-CM

## 2025-06-13 DIAGNOSIS — O09.523 MULTIGRAVIDA OF ADVANCED MATERNAL AGE IN THIRD TRIMESTER: Primary | ICD-10-CM

## 2025-06-13 DIAGNOSIS — B19.20 HEPATITIS C VIRUS INFECTION IN MOTHER DURING PREGNANCY: ICD-10-CM

## 2025-06-13 DIAGNOSIS — Z34.83 ENCOUNTER FOR SUPERVISION OF OTHER NORMAL PREGNANCY, THIRD TRIMESTER: ICD-10-CM

## 2025-06-13 DIAGNOSIS — Z3A.37 37 WEEKS GESTATION OF PREGNANCY: ICD-10-CM

## 2025-06-13 PROCEDURE — 99999 PR PBB SHADOW E&M-EST. PATIENT-LVL II: CPT | Mod: PBBFAC,,, | Performed by: OBSTETRICS & GYNECOLOGY

## 2025-06-13 PROCEDURE — 99212 OFFICE O/P EST SF 10 MIN: CPT | Mod: PBBFAC,TH | Performed by: OBSTETRICS & GYNECOLOGY

## 2025-06-20 ENCOUNTER — ROUTINE PRENATAL (OUTPATIENT)
Dept: OBSTETRICS AND GYNECOLOGY | Facility: CLINIC | Age: 40
End: 2025-06-20
Payer: MEDICAID

## 2025-06-20 VITALS
DIASTOLIC BLOOD PRESSURE: 82 MMHG | SYSTOLIC BLOOD PRESSURE: 126 MMHG | WEIGHT: 169.19 LBS | BODY MASS INDEX: 30.95 KG/M2 | HEART RATE: 106 BPM

## 2025-06-20 DIAGNOSIS — B19.20 HEPATITIS C VIRUS INFECTION IN MOTHER DURING PREGNANCY: ICD-10-CM

## 2025-06-20 DIAGNOSIS — O98.419 HEPATITIS C VIRUS INFECTION IN MOTHER DURING PREGNANCY: ICD-10-CM

## 2025-06-20 DIAGNOSIS — F11.20 PREGNANCY COMPLICATED BY SUBOXONE MAINTENANCE, ANTEPARTUM, THIRD TRIMESTER: ICD-10-CM

## 2025-06-20 DIAGNOSIS — Z3A.38 38 WEEKS GESTATION OF PREGNANCY: ICD-10-CM

## 2025-06-20 DIAGNOSIS — O99.323 PREGNANCY COMPLICATED BY SUBOXONE MAINTENANCE, ANTEPARTUM, THIRD TRIMESTER: ICD-10-CM

## 2025-06-20 DIAGNOSIS — O09.523 MULTIGRAVIDA OF ADVANCED MATERNAL AGE IN THIRD TRIMESTER: Primary | ICD-10-CM

## 2025-06-20 PROCEDURE — 99999 PR PBB SHADOW E&M-EST. PATIENT-LVL II: CPT | Mod: PBBFAC,,, | Performed by: OBSTETRICS & GYNECOLOGY

## 2025-06-20 PROCEDURE — 99212 OFFICE O/P EST SF 10 MIN: CPT | Mod: PBBFAC,TH | Performed by: OBSTETRICS & GYNECOLOGY

## 2025-06-20 NOTE — PROGRESS NOTES
Patient with no complaints. Denies vaginal bleeding or contractions. Good FM. Induction scheduled next week.      Vitals signs, FHTs, urine dip, and PE findings documented, reviewed and available in OB flow chart.       I spent a total of 20 minutes on the day of the visit.This includes face to face time and non-face to face time preparing to see the patient (eg, review of tests), Obtaining and/or reviewing separately obtained history, Documenting clinical information in the electronic or other health record, Independently interpreting resultsand communicating results to the patient/family/caregiver, or Care coordination.

## 2025-06-24 ENCOUNTER — HOSPITAL ENCOUNTER (INPATIENT)
Facility: HOSPITAL | Age: 40
LOS: 1 days | Discharge: HOME | End: 2025-06-25
Attending: OBSTETRICS & GYNECOLOGY | Admitting: OBSTETRICS & GYNECOLOGY
Payer: MEDICAID

## 2025-06-24 ENCOUNTER — ANESTHESIA (OUTPATIENT)
Dept: OBSTETRICS AND GYNECOLOGY | Facility: HOSPITAL | Age: 40
End: 2025-06-24
Payer: MEDICAID

## 2025-06-24 ENCOUNTER — ANESTHESIA EVENT (OUTPATIENT)
Dept: OBSTETRICS AND GYNECOLOGY | Facility: HOSPITAL | Age: 40
End: 2025-06-24
Payer: MEDICAID

## 2025-06-24 DIAGNOSIS — O09.523 MULTIGRAVIDA OF ADVANCED MATERNAL AGE IN THIRD TRIMESTER: ICD-10-CM

## 2025-06-24 DIAGNOSIS — Z34.90 ENCOUNTER FOR ELECTIVE INDUCTION OF LABOR: ICD-10-CM

## 2025-06-24 PROBLEM — Z33.2 ABORTION IN FIRST TRIMESTER: Status: RESOLVED | Noted: 2021-03-31 | Resolved: 2025-06-24

## 2025-06-24 PROBLEM — O09.893 SUPERVISION OF OTHER HIGH RISK PREGNANCIES, THIRD TRIMESTER: Status: ACTIVE | Noted: 2025-06-24

## 2025-06-24 PROBLEM — F17.200 TOBACCO DEPENDENCY: Status: ACTIVE | Noted: 2025-06-24

## 2025-06-24 PROBLEM — O09.522 MULTIGRAVIDA OF ADVANCED MATERNAL AGE IN SECOND TRIMESTER: Status: RESOLVED | Noted: 2025-01-28 | Resolved: 2025-06-24

## 2025-06-24 LAB
ABSOLUTE EOSINOPHIL (OHS): 0.3 K/UL
ABSOLUTE MONOCYTE (OHS): 1.67 K/UL (ref 0.3–1)
ABSOLUTE NEUTROPHIL COUNT (OHS): 13.39 K/UL (ref 1.8–7.7)
BASOPHILS # BLD AUTO: 0.08 K/UL
BASOPHILS NFR BLD AUTO: 0.4 %
ERYTHROCYTE [DISTWIDTH] IN BLOOD BY AUTOMATED COUNT: 14.1 % (ref 11.5–14.5)
GROUP & RH: NORMAL
HCT VFR BLD AUTO: 33.5 % (ref 37–48.5)
HGB BLD-MCNC: 11.6 GM/DL (ref 12–16)
IMM GRANULOCYTES # BLD AUTO: 0.31 K/UL (ref 0–0.04)
IMM GRANULOCYTES NFR BLD AUTO: 1.5 % (ref 0–0.5)
INDIRECT COOMBS: NORMAL
LYMPHOCYTES # BLD AUTO: 5.27 K/UL (ref 1–4.8)
MCH RBC QN AUTO: 30.6 PG (ref 27–31)
MCHC RBC AUTO-ENTMCNC: 34.6 G/DL (ref 32–36)
MCV RBC AUTO: 88 FL (ref 82–98)
NUCLEATED RBC (/100WBC) (OHS): 0 /100 WBC
PLATELET # BLD AUTO: 268 K/UL (ref 150–450)
PMV BLD AUTO: 11.6 FL (ref 9.2–12.9)
RBC # BLD AUTO: 3.79 M/UL (ref 4–5.4)
RELATIVE EOSINOPHIL (OHS): 1.4 %
RELATIVE LYMPHOCYTE (OHS): 25.1 % (ref 18–48)
RELATIVE MONOCYTE (OHS): 7.9 % (ref 4–15)
RELATIVE NEUTROPHIL (OHS): 63.7 % (ref 38–73)
T PALLIDUM IGG+IGM SER QL: NORMAL
WBC # BLD AUTO: 21.02 K/UL (ref 3.9–12.7)

## 2025-06-24 PROCEDURE — 72100002 HC LABOR CARE, 1ST 8 HOURS

## 2025-06-24 PROCEDURE — 3E033VJ INTRODUCTION OF OTHER HORMONE INTO PERIPHERAL VEIN, PERCUTANEOUS APPROACH: ICD-10-PCS | Performed by: OBSTETRICS & GYNECOLOGY

## 2025-06-24 PROCEDURE — 86593 SYPHILIS TEST NON-TREP QUANT: CPT | Performed by: OBSTETRICS & GYNECOLOGY

## 2025-06-24 PROCEDURE — 63600175 PHARM REV CODE 636 W HCPCS: Performed by: OBSTETRICS & GYNECOLOGY

## 2025-06-24 PROCEDURE — 63600175 PHARM REV CODE 636 W HCPCS: Performed by: NURSE ANESTHETIST, CERTIFIED REGISTERED

## 2025-06-24 PROCEDURE — 63600175 PHARM REV CODE 636 W HCPCS

## 2025-06-24 PROCEDURE — 85025 COMPLETE CBC W/AUTO DIFF WBC: CPT

## 2025-06-24 PROCEDURE — 25000003 PHARM REV CODE 250

## 2025-06-24 PROCEDURE — 86900 BLOOD TYPING SEROLOGIC ABO: CPT

## 2025-06-24 PROCEDURE — 59025 FETAL NON-STRESS TEST: CPT

## 2025-06-24 PROCEDURE — 36415 COLL VENOUS BLD VENIPUNCTURE: CPT

## 2025-06-24 PROCEDURE — 99900035 HC TECH TIME PER 15 MIN (STAT)

## 2025-06-24 PROCEDURE — 62326 NJX INTERLAMINAR LMBR/SAC: CPT | Performed by: NURSE ANESTHETIST, CERTIFIED REGISTERED

## 2025-06-24 PROCEDURE — 10907ZC DRAINAGE OF AMNIOTIC FLUID, THERAPEUTIC FROM PRODUCTS OF CONCEPTION, VIA NATURAL OR ARTIFICIAL OPENING: ICD-10-PCS | Performed by: OBSTETRICS & GYNECOLOGY

## 2025-06-24 PROCEDURE — 72200005 HC VAGINAL DELIVERY LEVEL II

## 2025-06-24 PROCEDURE — 51702 INSERT TEMP BLADDER CATH: CPT

## 2025-06-24 PROCEDURE — 11000001 HC ACUTE MED/SURG PRIVATE ROOM

## 2025-06-24 PROCEDURE — 0HQ9XZZ REPAIR PERINEUM SKIN, EXTERNAL APPROACH: ICD-10-PCS | Performed by: OBSTETRICS & GYNECOLOGY

## 2025-06-24 PROCEDURE — 25000003 PHARM REV CODE 250: Performed by: OBSTETRICS & GYNECOLOGY

## 2025-06-24 RX ORDER — ROPIVACAINE HYDROCHLORIDE 2 MG/ML
INJECTION, SOLUTION EPIDURAL; INFILTRATION CONTINUOUS PRN
Status: DISCONTINUED | OUTPATIENT
Start: 2025-06-24 | End: 2025-06-24

## 2025-06-24 RX ORDER — QUETIAPINE FUMARATE 300 MG/1
300 TABLET, FILM COATED ORAL NIGHTLY
Status: DISCONTINUED | OUTPATIENT
Start: 2025-06-24 | End: 2025-06-25 | Stop reason: HOSPADM

## 2025-06-24 RX ORDER — HYDROCORTISONE 25 MG/G
CREAM TOPICAL 3 TIMES DAILY PRN
Status: DISCONTINUED | OUTPATIENT
Start: 2025-06-24 | End: 2025-06-25 | Stop reason: HOSPADM

## 2025-06-24 RX ORDER — ONDANSETRON 8 MG/1
8 TABLET, ORALLY DISINTEGRATING ORAL EVERY 8 HOURS PRN
Status: DISCONTINUED | OUTPATIENT
Start: 2025-06-24 | End: 2025-06-25 | Stop reason: HOSPADM

## 2025-06-24 RX ORDER — BUTORPHANOL TARTRATE 2 MG/ML
2 INJECTION INTRAMUSCULAR; INTRAVENOUS
Status: DISCONTINUED | OUTPATIENT
Start: 2025-06-24 | End: 2025-06-24

## 2025-06-24 RX ORDER — DIPHENOXYLATE HYDROCHLORIDE AND ATROPINE SULFATE 2.5; .025 MG/1; MG/1
2 TABLET ORAL EVERY 6 HOURS PRN
Status: DISCONTINUED | OUTPATIENT
Start: 2025-06-24 | End: 2025-06-25 | Stop reason: HOSPADM

## 2025-06-24 RX ORDER — GABAPENTIN 800 MG/1
800 TABLET ORAL 2 TIMES DAILY
Status: CANCELLED | OUTPATIENT
Start: 2025-06-24

## 2025-06-24 RX ORDER — MISOPROSTOL 200 UG/1
800 TABLET ORAL ONCE AS NEEDED
Status: DISCONTINUED | OUTPATIENT
Start: 2025-06-24 | End: 2025-06-25 | Stop reason: HOSPADM

## 2025-06-24 RX ORDER — DOCUSATE SODIUM 100 MG/1
200 CAPSULE, LIQUID FILLED ORAL 2 TIMES DAILY PRN
Status: DISCONTINUED | OUTPATIENT
Start: 2025-06-24 | End: 2025-06-25 | Stop reason: HOSPADM

## 2025-06-24 RX ORDER — OXYTOCIN-SODIUM CHLORIDE 0.9% IV SOLN 30 UNIT/500ML 30-0.9/5 UT/ML-%
95 SOLUTION INTRAVENOUS CONTINUOUS PRN
Status: DISCONTINUED | OUTPATIENT
Start: 2025-06-24 | End: 2025-06-25 | Stop reason: HOSPADM

## 2025-06-24 RX ORDER — HYDROXYZINE PAMOATE 25 MG/1
25 CAPSULE ORAL ONCE
Status: COMPLETED | OUTPATIENT
Start: 2025-06-24 | End: 2025-06-24

## 2025-06-24 RX ORDER — ONDANSETRON 8 MG/1
8 TABLET, ORALLY DISINTEGRATING ORAL EVERY 8 HOURS PRN
Status: DISCONTINUED | OUTPATIENT
Start: 2025-06-24 | End: 2025-06-24

## 2025-06-24 RX ORDER — CARBOPROST TROMETHAMINE 250 UG/ML
250 INJECTION, SOLUTION INTRAMUSCULAR
Status: DISCONTINUED | OUTPATIENT
Start: 2025-06-24 | End: 2025-06-25 | Stop reason: HOSPADM

## 2025-06-24 RX ORDER — METHYLERGONOVINE MALEATE 0.2 MG/ML
200 INJECTION INTRAVENOUS ONCE AS NEEDED
Status: DISCONTINUED | OUTPATIENT
Start: 2025-06-24 | End: 2025-06-25 | Stop reason: HOSPADM

## 2025-06-24 RX ORDER — ACETAMINOPHEN 325 MG/1
650 TABLET ORAL EVERY 6 HOURS PRN
Status: DISCONTINUED | OUTPATIENT
Start: 2025-06-24 | End: 2025-06-24

## 2025-06-24 RX ORDER — OXYTOCIN-SODIUM CHLORIDE 0.9% IV SOLN 30 UNIT/500ML 30-0.9/5 UT/ML-%
10 SOLUTION INTRAVENOUS ONCE AS NEEDED
Status: DISCONTINUED | OUTPATIENT
Start: 2025-06-24 | End: 2025-06-24

## 2025-06-24 RX ORDER — ROPIVACAINE HYDROCHLORIDE 5 MG/ML
INJECTION, SOLUTION EPIDURAL; INFILTRATION; PERINEURAL
Status: DISCONTINUED | OUTPATIENT
Start: 2025-06-24 | End: 2025-06-24

## 2025-06-24 RX ORDER — OXYTOCIN-SODIUM CHLORIDE 0.9% IV SOLN 30 UNIT/500ML 30-0.9/5 UT/ML-%
95 SOLUTION INTRAVENOUS ONCE AS NEEDED
Status: DISCONTINUED | OUTPATIENT
Start: 2025-06-24 | End: 2025-06-25 | Stop reason: HOSPADM

## 2025-06-24 RX ORDER — GABAPENTIN 400 MG/1
800 CAPSULE ORAL 2 TIMES DAILY
Status: DISCONTINUED | OUTPATIENT
Start: 2025-06-24 | End: 2025-06-25 | Stop reason: HOSPADM

## 2025-06-24 RX ORDER — OXYTOCIN-SODIUM CHLORIDE 0.9% IV SOLN 30 UNIT/500ML 30-0.9/5 UT/ML-%
30 SOLUTION INTRAVENOUS ONCE AS NEEDED
Status: COMPLETED | OUTPATIENT
Start: 2025-06-24 | End: 2025-06-24

## 2025-06-24 RX ORDER — OXYTOCIN-SODIUM CHLORIDE 0.9% IV SOLN 30 UNIT/500ML 30-0.9/5 UT/ML-%
0-32 SOLUTION INTRAVENOUS CONTINUOUS
Status: DISCONTINUED | OUTPATIENT
Start: 2025-06-24 | End: 2025-06-24

## 2025-06-24 RX ORDER — SODIUM CHLORIDE 0.9 % (FLUSH) 0.9 %
10 SYRINGE (ML) INJECTION
Status: DISCONTINUED | OUTPATIENT
Start: 2025-06-24 | End: 2025-06-25 | Stop reason: HOSPADM

## 2025-06-24 RX ORDER — FENTANYL CITRATE 50 UG/ML
INJECTION, SOLUTION INTRAMUSCULAR; INTRAVENOUS
Status: DISCONTINUED | OUTPATIENT
Start: 2025-06-24 | End: 2025-06-24

## 2025-06-24 RX ORDER — OXYTOCIN 10 [USP'U]/ML
10 INJECTION, SOLUTION INTRAMUSCULAR; INTRAVENOUS ONCE AS NEEDED
Status: DISCONTINUED | OUTPATIENT
Start: 2025-06-24 | End: 2025-06-25 | Stop reason: HOSPADM

## 2025-06-24 RX ORDER — BUPRENORPHINE AND NALOXONE 8; 2 MG/1; MG/1
2 FILM, SOLUBLE BUCCAL; SUBLINGUAL DAILY
Status: DISCONTINUED | OUTPATIENT
Start: 2025-06-25 | End: 2025-06-24

## 2025-06-24 RX ORDER — DIPHENHYDRAMINE HYDROCHLORIDE 50 MG/ML
25 INJECTION, SOLUTION INTRAMUSCULAR; INTRAVENOUS EVERY 4 HOURS PRN
Status: DISCONTINUED | OUTPATIENT
Start: 2025-06-24 | End: 2025-06-25 | Stop reason: HOSPADM

## 2025-06-24 RX ORDER — BUPRENORPHINE HYDROCHLORIDE AND NALOXONE HYDROCHLORIDE DIHYDRATE 8; 2 MG/1; MG/1
8 TABLET SUBLINGUAL EVERY 12 HOURS
Status: DISCONTINUED | OUTPATIENT
Start: 2025-06-24 | End: 2025-06-25

## 2025-06-24 RX ORDER — BUPRENORPHINE HYDROCHLORIDE AND NALOXONE HYDROCHLORIDE DIHYDRATE 8; 2 MG/1; MG/1
8 TABLET SUBLINGUAL DAILY
Status: DISCONTINUED | OUTPATIENT
Start: 2025-06-25 | End: 2025-06-24

## 2025-06-24 RX ORDER — SODIUM CHLORIDE 9 MG/ML
INJECTION, SOLUTION INTRAVENOUS
Status: DISCONTINUED | OUTPATIENT
Start: 2025-06-24 | End: 2025-06-24

## 2025-06-24 RX ORDER — CALCIUM CARBONATE 200(500)MG
500 TABLET,CHEWABLE ORAL 3 TIMES DAILY PRN
Status: DISCONTINUED | OUTPATIENT
Start: 2025-06-24 | End: 2025-06-24

## 2025-06-24 RX ORDER — QUETIAPINE FUMARATE 100 MG/1
100 TABLET, FILM COATED ORAL ONCE
Status: COMPLETED | OUTPATIENT
Start: 2025-06-24 | End: 2025-06-24

## 2025-06-24 RX ORDER — SIMETHICONE 80 MG
1 TABLET,CHEWABLE ORAL EVERY 6 HOURS PRN
Status: DISCONTINUED | OUTPATIENT
Start: 2025-06-24 | End: 2025-06-25 | Stop reason: HOSPADM

## 2025-06-24 RX ORDER — TRANEXAMIC ACID 10 MG/ML
1000 INJECTION, SOLUTION INTRAVENOUS EVERY 30 MIN PRN
Status: DISCONTINUED | OUTPATIENT
Start: 2025-06-24 | End: 2025-06-25 | Stop reason: HOSPADM

## 2025-06-24 RX ORDER — IBUPROFEN 600 MG/1
600 TABLET, FILM COATED ORAL EVERY 6 HOURS PRN
Status: DISCONTINUED | OUTPATIENT
Start: 2025-06-24 | End: 2025-06-25 | Stop reason: HOSPADM

## 2025-06-24 RX ORDER — PRENATAL WITH FERROUS FUM AND FOLIC ACID 3080; 920; 120; 400; 22; 1.84; 3; 20; 10; 1; 12; 200; 27; 25; 2 [IU]/1; [IU]/1; MG/1; [IU]/1; MG/1; MG/1; MG/1; MG/1; MG/1; MG/1; UG/1; MG/1; MG/1; MG/1; MG/1
1 TABLET ORAL DAILY
Status: DISCONTINUED | OUTPATIENT
Start: 2025-06-25 | End: 2025-06-25 | Stop reason: HOSPADM

## 2025-06-24 RX ORDER — IBUPROFEN 200 MG
1 TABLET ORAL DAILY
Status: DISCONTINUED | OUTPATIENT
Start: 2025-06-24 | End: 2025-06-24

## 2025-06-24 RX ORDER — LIDOCAINE HYDROCHLORIDE 10 MG/ML
10 INJECTION, SOLUTION INFILTRATION; PERINEURAL ONCE AS NEEDED
Status: DISCONTINUED | OUTPATIENT
Start: 2025-06-24 | End: 2025-06-24

## 2025-06-24 RX ORDER — SIMETHICONE 80 MG
1 TABLET,CHEWABLE ORAL 4 TIMES DAILY PRN
Status: DISCONTINUED | OUTPATIENT
Start: 2025-06-24 | End: 2025-06-24

## 2025-06-24 RX ORDER — ACETAMINOPHEN 325 MG/1
650 TABLET ORAL EVERY 6 HOURS SCHEDULED
Status: DISCONTINUED | OUTPATIENT
Start: 2025-06-24 | End: 2025-06-25 | Stop reason: HOSPADM

## 2025-06-24 RX ORDER — CLINDAMYCIN PHOSPHATE 900 MG/50ML
900 INJECTION, SOLUTION INTRAVENOUS
Status: DISCONTINUED | OUTPATIENT
Start: 2025-06-24 | End: 2025-06-24

## 2025-06-24 RX ORDER — DIPHENHYDRAMINE HCL 25 MG
25 CAPSULE ORAL EVERY 4 HOURS PRN
Status: DISCONTINUED | OUTPATIENT
Start: 2025-06-24 | End: 2025-06-25 | Stop reason: HOSPADM

## 2025-06-24 RX ORDER — BUTORPHANOL TARTRATE 2 MG/ML
1 INJECTION INTRAMUSCULAR; INTRAVENOUS
Status: DISCONTINUED | OUTPATIENT
Start: 2025-06-24 | End: 2025-06-24

## 2025-06-24 RX ORDER — SODIUM CHLORIDE, SODIUM LACTATE, POTASSIUM CHLORIDE, CALCIUM CHLORIDE 600; 310; 30; 20 MG/100ML; MG/100ML; MG/100ML; MG/100ML
INJECTION, SOLUTION INTRAVENOUS CONTINUOUS
Status: DISCONTINUED | OUTPATIENT
Start: 2025-06-24 | End: 2025-06-24

## 2025-06-24 RX ORDER — METOCLOPRAMIDE HYDROCHLORIDE 5 MG/ML
5 INJECTION INTRAMUSCULAR; INTRAVENOUS EVERY 6 HOURS PRN
Status: DISCONTINUED | OUTPATIENT
Start: 2025-06-24 | End: 2025-06-24

## 2025-06-24 RX ADMIN — Medication 4 MILLI-UNITS/MIN: at 06:06

## 2025-06-24 RX ADMIN — SODIUM CHLORIDE, POTASSIUM CHLORIDE, SODIUM LACTATE AND CALCIUM CHLORIDE: 600; 310; 30; 20 INJECTION, SOLUTION INTRAVENOUS at 01:06

## 2025-06-24 RX ADMIN — HYDROXYZINE PAMOATE 25 MG: 25 CAPSULE ORAL at 07:06

## 2025-06-24 RX ADMIN — SODIUM CHLORIDE, POTASSIUM CHLORIDE, SODIUM LACTATE AND CALCIUM CHLORIDE: 600; 310; 30; 20 INJECTION, SOLUTION INTRAVENOUS at 09:06

## 2025-06-24 RX ADMIN — ACETAMINOPHEN 650 MG: 325 TABLET ORAL at 09:06

## 2025-06-24 RX ADMIN — CALCIUM CARBONATE (ANTACID) CHEW TAB 500 MG 500 MG: 500 CHEW TAB at 02:06

## 2025-06-24 RX ADMIN — DEXTROSE MONOHYDRATE 3 MILLION UNITS: 50 INJECTION, SOLUTION INTRAVENOUS at 02:06

## 2025-06-24 RX ADMIN — ROPIVACAINE HYDROCHLORIDE 12 ML/HR: 2 INJECTION, SOLUTION EPIDURAL; INFILTRATION at 09:06

## 2025-06-24 RX ADMIN — GABAPENTIN 800 MG: 400 CAPSULE ORAL at 09:06

## 2025-06-24 RX ADMIN — IBUPROFEN 600 MG: 600 TABLET ORAL at 11:06

## 2025-06-24 RX ADMIN — DEXTROSE MONOHYDRATE 3 MILLION UNITS: 50 INJECTION, SOLUTION INTRAVENOUS at 10:06

## 2025-06-24 RX ADMIN — QUETIAPINE FUMARATE 100 MG: 100 TABLET ORAL at 09:06

## 2025-06-24 RX ADMIN — DEXTROSE MONOHYDRATE 5 MILLION UNITS: 5 INJECTION INTRAVENOUS at 06:06

## 2025-06-24 RX ADMIN — OXYTOCIN-SODIUM CHLORIDE 0.9% IV SOLN 30 UNIT/500ML 30 UNITS: 30-0.9/5 SOLUTION at 04:06

## 2025-06-24 RX ADMIN — SODIUM CHLORIDE, POTASSIUM CHLORIDE, SODIUM LACTATE AND CALCIUM CHLORIDE: 600; 310; 30; 20 INJECTION, SOLUTION INTRAVENOUS at 06:06

## 2025-06-24 RX ADMIN — FENTANYL CITRATE 100 MCG: 50 INJECTION, SOLUTION INTRAMUSCULAR; INTRAVENOUS at 09:06

## 2025-06-24 RX ADMIN — ROPIVACAINE HYDROCHLORIDE 5 ML: 5 INJECTION, SOLUTION EPIDURAL; INFILTRATION; PERINEURAL at 09:06

## 2025-06-24 RX ADMIN — IBUPROFEN 600 MG: 600 TABLET ORAL at 06:06

## 2025-06-24 NOTE — ASSESSMENT & PLAN NOTE
Noted, Patient follows with [facility].  She has completed a rehabilitation program.   Doing well and has good support.

## 2025-06-24 NOTE — ASSESSMENT & PLAN NOTE
Admit for pitocin induction of labor.   PCN for prophylaxis.  Patient cleared for Anesthesia: Epidural    Anesthesia/Surgery risks, benefits, and alternative options discussed and understood by patient/fa

## 2025-06-24 NOTE — ANESTHESIA POSTPROCEDURE EVALUATION
Anesthesia Post Evaluation    Patient: Joana Thomas    Procedure(s) Performed: * No procedures listed *    Final Anesthesia Type: epidural      Patient location during evaluation: labor & delivery  Patient participation: Yes- Able to Participate  Level of consciousness: awake and alert, oriented and awake  Post-procedure vital signs: reviewed and stable  Pain management: adequate  Airway patency: patent    PONV status at discharge: No PONV  Anesthetic complications: no      Cardiovascular status: blood pressure returned to baseline, hemodynamically stable and stable  Respiratory status: unassisted, spontaneous ventilation and room air  Hydration status: euvolemic  Follow-up not needed.              Vitals Value Taken Time   /62 06/24/25 17:04   Temp 35.6 °C (96.1 °F) 06/24/25 12:34   Pulse 85 06/24/25 17:04   Resp 18 06/24/25 07:08   SpO2 86 % 06/24/25 16:38   Vitals shown include unfiled device data.      No case tracking events are documented in the log.      Pain/Herb Score: No data recorded
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
## 2025-06-24 NOTE — H&P
[facility] - Labor & Delivery  Obstetrics  History & Physical    Patient Name: Joana Thomas  MRN: [id number]  Admission Date: 2025  Primary Care Provider: Tish, Primary Doctor    Subjective:     Principal Problem:Supervision of other high risk pregnancies, third trimester    History of Present Illness:  Pt is a  @ 39 3/7 WGA who presents for scheduled induction of labor.  Pregnancy complicated by GBS positive.  H/o HSV on prophylaxis with no prodromal symptoms and no recent h/o outbreak.  Pt also has h/o Hepatitis C, started but did not complete treatment.  Pt also with h/o polysubstance abuse in the past.  She has been sober through this pregnancy and has completed a rehabilitation program.  She follows with [facility] and is on Suboxone.      Obstetric HPI:  Patient reports contractions, active fetal movement, No vaginal bleeding , No loss of fluid     This pregnancy has been complicated by GBS positive, h/o  delivery, h/o MOSHE in past on monitored therapy now, hepatitis C, h/o HSV.     OB History    Para Term  AB Living   8 4 2 2 3 4   SAB IAB Ectopic Multiple Live Births   2 1 0 0 4      # Outcome Date GA Lbr Abad/2nd Weight Sex Type Anes PTL Lv   8 Current            7 SAB 2024 4w0d    SAB   FD   6 Term 21 40w0d  2.722 kg (6 lb) M Vag-Spont EPI N ROEL   5 IAB  12w0d    TAB   FD      Complications: History of dilatation and curettage   4 2018 8w0d    SAB   FD   3  16 28w0d  1.361 kg (3 lb) F Vag-Spont None Y ROEL   2  10/21/15 28w5d  1.49 kg (3 lb 4.6 oz) M Vag-Spont None Y ROEL      Apgar1: 7  Apgar5: 7   1 Term 09 40w0d  2.92 kg (6 lb 7 oz) M Vag-Spont EPI N ROEL     Past Medical History:   Diagnosis Date    Abnormal Pap smear of cervix     ADD (attention deficit disorder)     on Vivance    Addiction to drug 2017    [facility]ically diagnosed at [facility] in     ADHD (attention deficit hyperactivity disorder)  02/28/2008    ADD treatment on an outpatient basis in [place]    Alcohol abuse 2015    DWI and went to [place] for treatment.    Anxiety 2017    [facility]    Depression 2017    [facility]    Hep B complicating pregnancy     Hep C w/o coma, chronic     Herpes simplex virus (HSV) infection     Hx of psychiatric care 02/20/2008    [facility] for ADD    Liver disease 2015    [facility]    Psychiatric problem 2008    Restlessness    Psychosis 2017    [facility]    Therapy 08/02/2008    Outpatient therapy in [place]    Thyroid disease 2015    [facility]     Past Surgical History:   Procedure Laterality Date    DILATION AND CURETTAGE OF UTERUS      TRANSESOPHAGEAL ECHOCARDIOGRAPHY N/A 09/12/2022    Procedure: ECHOCARDIOGRAM, TRANSESOPHAGEAL;  Surgeon: Adiel Kirby MD;  Location: [facility];  Service: Cardiology;  Laterality: N/A;       PTA Medications   Medication Sig    prenatal vit no.130-iron-folic (PRENATAL VITAMIN) 27 mg iron- 800 mcg Tab Take 1 tablet by mouth once daily.    QUEtiapine (SEROQUEL) 300 MG Tab Take 300 mg by mouth every evening.    SUBOXONE 8-2 mg Place 2 Film under the tongue once daily.    valACYclovir (VALTREX) 1000 MG tablet Take 1 tablet (1,000 mg total) by mouth once daily.    cariprazine (VRAYLAR) 1.5 mg Cap Take 1.5 mg by mouth.    dextroamphetamine-amphetamine (ADDERALL) 12.5 MG tablet Take 30 mg by mouth once daily. (Patient not taking: Reported on 5/16/2025)    dextroamphetamine-amphetamine (ADDERALL) 30 mg Tab     gabapentin (NEURONTIN) 800 MG tablet Take 800 mg by mouth 2 (two) times daily.       Review of patient's allergies indicates:  No Known Allergies     Family History       Problem Relation (Age of Onset)    Addiction problem Brother    Anxiety disorder Mother    Bipolar disorder Brother    No Known Problems Paternal Grandfather, Paternal Grandmother, Maternal Grandmother, Maternal Grandfather, Father, Sister, Maternal Aunt,  Maternal Uncle, Paternal Uncle, Cousin, Cousin          Tobacco Use    Smoking status: Every Day     Types: Cigarettes, Vaping with nicotine     Start date: 1/25/2008     Passive exposure: Current    Smokeless tobacco: Never   Substance and Sexual Activity    Alcohol use: Not Currently     Alcohol/week: 1.0 standard drink of alcohol     Types: 1 Cans of beer per week     Comment: In the past, says she no longer uses alcohol.     Drug use: Not Currently     Types: Cocaine, Marijuana, Heroin, Methamphetamines     Comment: Sober since Oct 2024    Sexual activity: Yes     Partners: Male     Review of Systems   Constitutional:  Negative for activity change, appetite change, chills, diaphoresis, fatigue and fever.   Eyes:  Negative for visual disturbance.   Respiratory:  Negative for cough, shortness of breath and wheezing.    Cardiovascular:  Negative for chest pain and palpitations.   Gastrointestinal:  Negative for abdominal pain, constipation, diarrhea, nausea and vomiting.   Genitourinary:  Negative for dysuria, genital sores, pelvic pain, urgency, vaginal bleeding, vaginal discharge, vaginal pain and vaginal odor.   Musculoskeletal:  Negative for back pain, joint swelling and myalgias.   Integumentary:  Negative for rash.   Neurological:  Negative for seizures, syncope, numbness and headaches.   Hematological:  Negative for adenopathy. Does not bruise/bleed easily.   Psychiatric/Behavioral:  Negative for depression. The patient is not nervous/anxious.       Objective:     Vital Signs (Most Recent):  Temp: 97 °F (36.1 °C) (06/24/25 0715)  Pulse: 93 (06/24/25 0941)  Resp: 18 (06/24/25 0708)  BP: 131/75 (06/24/25 0941)  SpO2: 99 % (06/24/25 0937) Vital Signs (24h Range):  Temp:  [96.4 °F (35.8 °C)-97 °F (36.1 °C)] 97 °F (36.1 °C)  Pulse:  [] 93  Resp:  [18-20] 18  SpO2:  [97 %-100 %] 99 %  BP: (117-146)/(59-81) 131/75     Weight: 76.7 kg (169 lb 1.5 oz)  Body mass index is 30.93 kg/m².    FHT: 120 Cat 1  (reassuring)  TOCO:  Q 2-3 minutes     Physical Exam:   Constitutional: She is oriented to person, place, and time. She appears well-developed and well-nourished. No distress.    HENT:   Head: Normocephalic and atraumatic.    Eyes: Conjunctivae and EOM are normal.      Pulmonary/Chest: Effort normal. No respiratory distress.          Genitourinary:    Vagina normal.             Musculoskeletal: Normal range of motion.       Neurological: She is alert and oriented to person, place, and time.    Skin: Skin is warm and dry.    Psychiatric: She has a normal mood and affect. Her behavior is normal. Judgment and thought content normal.        Cervix:  Dilation:  3  Effacement:  50%  Station: -3  Presentation: Vertex     Significant Labs:  Lab Results   Component Value Date    GROUPTRH O POS 2025    HEPBSAG Nonreactive 2025    RUBELLAIGGSC IMMUNE 03/10/2021    STREPBCULT No Group B Streptococcus isolated 10/20/2015    AFP 16.1 2025       I have personallly reviewed all pertinent lab results from the last 24 hours.  Recent Lab Results         25  0535        Baso # 0.08       Basophil % 0.4       Eos # 0.30       Eos % 1.4       Gran # (ANC) 13.39       Group & Rh O POS       Hematocrit 33.5       Hemoglobin 11.6       Immature Grans (Abs) 0.31  Comment: Mild elevation in immature granulocytes is non specific and can be seen in a variety of conditions including stress response, acute inflammation, trauma and pregnancy. Correlation with other laboratory and clinical findings is essential.       Immature Granulocytes 1.5       INDIRECT MAHNAZ NEG       Lymph # 5.27       Lymph % 25.1       MCH 30.6       MCHC 34.6       MCV 88       Mono # 1.67       Mono % 7.9       MPV 11.6       Neut % 63.7       nRBC 0       Platelet Count 268       RBC 3.79       RDW 14.1       WBC 21.02             Assessment/Plan:     39 y.o. female  at 39w3d for:    * Supervision of other high risk pregnancies, third  trimester  Admit for pitocin induction of labor.   PCN for prophylaxis.  Patient cleared for Anesthesia: Epidural    Anesthesia/Surgery risks, benefits, and alternative options discussed and understood by patient/fa      Pregnancy complicated by suboxone maintenance, antepartum, third trimester  Noted, Patient follows with [facility].  She has completed a rehabilitation program.   Doing well and has good support.       Multigravida of advanced maternal age in third trimester  Pt had negative NIPT testing.    - Hepatitis C virus infection in mother during pregnancy  Noted        Dean Katz MD  Obstetrics  [facility] - Labor & Delivery

## 2025-06-24 NOTE — ANESTHESIA PREPROCEDURE EVALUATION
06/24/2025  Joana Thomas is a 39 y.o., female.      Pre-op Assessment    I have reviewed the Patient Summary Reports.     I have reviewed the Nursing Notes. I have reviewed the NPO Status.   I have reviewed the Medications.     Review of Systems  Anesthesia Hx:  No problems with previous Anesthesia             Denies Family Hx of Anesthesia complications.    Denies Personal Hx of Anesthesia complications.                    Social:  Alcohol Use, Smoker Polysubstance abuse      Hematology/Oncology:       -- Anemia:                                  Cardiovascular:  Exercise tolerance: poor                 ECG has been reviewed.  ECHO 2022:   Concentric hypertrophy and normal systolic function.   The estimated ejection fraction is 65%.   Normal left ventricular diastolic function.   Normal right ventricular size with normal right ventricular systolic function.   Mild mitral regurgitation.   Mild tricuspid regurgitation.   Normal central venous pressure (3 mmHg).   The estimated PA systolic pressure is 27 mmHg.    Echo yesterday to rule out endocarditis -   TTE and FRANSICO negative for vegetations. CXR is negative for infiltrates.                                Hepatic/GI:      Denies GERD. Liver Disease, Hepatitis, C Hep B, Hep C    Elevated LFTs             Musculoskeletal:  Musculoskeletal Normal                Neurological:  Neurology Normal                                      Endocrine:  Endocrine Normal            Psych:  Psychiatric History anxiety depression                Physical Exam  General: Alert, Oriented and Well nourished  Not cooperative  Airway:  Mallampati: II   Mouth Opening: Normal  TM Distance: Normal  Tongue: Normal  Neck ROM: Normal ROM    Dental:  Intact    Chest/Lungs:  Clear to auscultation, Normal Respiratory Rate    Heart:  Rate: Normal  Rhythm: Regular Rhythm        Anesthesia  Plan  Type of Anesthesia, risks & benefits discussed:    Anesthesia Type: Epidural  Intra-op Monitoring Plan: Standard ASA Monitors  Post Op Pain Control Plan: epidural analgesia  Informed Consent: Informed consent signed with the Patient and all parties understand the risks and agree with anesthesia plan.  All questions answered.   ASA Score: 3  Day of Surgery Review of History & Physical: H&P Update referred to the surgeon/provider.I have interviewed and examined the patient. I have reviewed the patient's H&P dated: 6/24/25. There are no significant changes.   Anesthesia Plan Notes:   Hep C, Hep B  Polysubstance abuse      Ready For Surgery From Anesthesia Perspective.     .  Recent Labs   Lab 06/24/25  0535   WBC 21.02*   RBC 3.79*   HGB 11.6*   HCT 33.5*      MCV 88   MCH 30.6   MCHC 34.6

## 2025-06-24 NOTE — NURSING
Behavioral Health Risks Screening Tool completed with patient during admission. Patient screened positive for tobacco use.and history of substance abuse.     At this time a brief intervention/brief treatment with the patient was provided.  I stated my medical concern to the patient regarding tobacco use.  Patient educated on increase risk of postpartum depression (PPD) due to having a history of depression or experiencing depression at this time. Patient instructed that at any point in the postpartum period she feels depressed to contact her MD immediately due to the serious nature of PPD.< Patient verbalizes understanding.   I advised the patient to abstain and/or reduce use of  nicotine.  I checked the patient's reaction through observation of response during our discussion. The patient was receptive of information provided and discussed with patient.   I referred the patient for further assessment via a social work consult due to this positive screening.   I provided the patient with written information/resources on Tobacco use in Pregnancy and Mental Health & Substance Use Disorder Resource Guide

## 2025-06-24 NOTE — SUBJECTIVE & OBJECTIVE
Obstetric HPI:  Patient reports contractions, active fetal movement, No vaginal bleeding , No loss of fluid     This pregnancy has been complicated by GBS positive, h/o  delivery, h/o MOSHE in past on monitored therapy now, hepatitis C, h/o HSV.     OB History    Para Term  AB Living   8 4 2 2 3 4   SAB IAB Ectopic Multiple Live Births   2 1 0 0 4      # Outcome Date GA Lbr Abad/2nd Weight Sex Type Anes PTL Lv   8 Current            7 SAB 2024 4w0d    SAB   FD   6 Term 21 40w0d  2.722 kg (6 lb) M Vag-Spont EPI N ROEL   5 IAB  12w0d    TAB   FD      Complications: History of dilatation and curettage   4 SAB  8w0d    SAB   FD   3  16 28w0d  1.361 kg (3 lb) F Vag-Spont None Y ROEL   2  10/21/15 28w5d  1.49 kg (3 lb 4.6 oz) M Vag-Spont None Y ROEL      Apgar1: 7  Apgar5: 7   1 Term 09 40w0d  2.92 kg (6 lb 7 oz) M Vag-Spont EPI N ROEL     Past Medical History:   Diagnosis Date    Abnormal Pap smear of cervix     ADD (attention deficit disorder)     on Vivance    Addiction to drug 2017    Offically diagnosed at [facility] in     ADHD (attention deficit hyperactivity disorder) 2008    ADD treatment on an outpatient basis in [place]    Alcohol abuse 2015    DWI and went to [place] for treatment.    Anxiety     [facility]    Depression 2017    [facility]    Hep B complicating pregnancy     Hep C w/o coma, chronic     Herpes simplex virus (HSV) infection     Hx of psychiatric care 2008    [facility] for ADD    Liver disease     [facility]    Psychiatric problem 2008    Restlessness    Psychosis 2017    [facility]    Therapy 2008    Outpatient therapy in [place]    Thyroid disease     [facility]     Past Surgical History:   Procedure Laterality Date    DILATION AND CURETTAGE OF UTERUS      TRANSESOPHAGEAL ECHOCARDIOGRAPHY N/A 2022    Procedure: ECHOCARDIOGRAM,  TRANSESOPHAGEAL;  Surgeon: Adiel Kirby MD;  Location: [facility];  Service: Cardiology;  Laterality: N/A;       PTA Medications   Medication Sig    prenatal vit no.130-iron-folic (PRENATAL VITAMIN) 27 mg iron- 800 mcg Tab Take 1 tablet by mouth once daily.    QUEtiapine (SEROQUEL) 300 MG Tab Take 300 mg by mouth every evening.    SUBOXONE 8-2 mg Place 2 Film under the tongue once daily.    valACYclovir (VALTREX) 1000 MG tablet Take 1 tablet (1,000 mg total) by mouth once daily.    cariprazine (VRAYLAR) 1.5 mg Cap Take 1.5 mg by mouth.    dextroamphetamine-amphetamine (ADDERALL) 12.5 MG tablet Take 30 mg by mouth once daily. (Patient not taking: Reported on 5/16/2025)    dextroamphetamine-amphetamine (ADDERALL) 30 mg Tab     gabapentin (NEURONTIN) 800 MG tablet Take 800 mg by mouth 2 (two) times daily.       Review of patient's allergies indicates:  No Known Allergies     Family History       Problem Relation (Age of Onset)    Addiction problem Brother    Anxiety disorder Mother    Bipolar disorder Brother    No Known Problems Paternal Grandfather, Paternal Grandmother, Maternal Grandmother, Maternal Grandfather, Father, Sister, Maternal Aunt, Maternal Uncle, Paternal Uncle, Cousin, Cousin          Tobacco Use    Smoking status: Every Day     Types: Cigarettes, Vaping with nicotine     Start date: 1/25/2008     Passive exposure: Current    Smokeless tobacco: Never   Substance and Sexual Activity    Alcohol use: Not Currently     Alcohol/week: 1.0 standard drink of alcohol     Types: 1 Cans of beer per week     Comment: In the past, says she no longer uses alcohol.     Drug use: Not Currently     Types: Cocaine, Marijuana, Heroin, Methamphetamines     Comment: Sober since Oct 2024    Sexual activity: Yes     Partners: Male     Review of Systems   Constitutional:  Negative for activity change, appetite change, chills, diaphoresis, fatigue and fever.   Eyes:  Negative for visual disturbance.   Respiratory:   Negative for cough, shortness of breath and wheezing.    Cardiovascular:  Negative for chest pain and palpitations.   Gastrointestinal:  Negative for abdominal pain, constipation, diarrhea, nausea and vomiting.   Genitourinary:  Negative for dysuria, genital sores, pelvic pain, urgency, vaginal bleeding, vaginal discharge, vaginal pain and vaginal odor.   Musculoskeletal:  Negative for back pain, joint swelling and myalgias.   Integumentary:  Negative for rash.   Neurological:  Negative for seizures, syncope, numbness and headaches.   Hematological:  Negative for adenopathy. Does not bruise/bleed easily.   Psychiatric/Behavioral:  Negative for depression. The patient is not nervous/anxious.       Objective:     Vital Signs (Most Recent):  Temp: 97 °F (36.1 °C) (06/24/25 0715)  Pulse: 93 (06/24/25 0941)  Resp: 18 (06/24/25 0708)  BP: 131/75 (06/24/25 0941)  SpO2: 99 % (06/24/25 0937) Vital Signs (24h Range):  Temp:  [96.4 °F (35.8 °C)-97 °F (36.1 °C)] 97 °F (36.1 °C)  Pulse:  [] 93  Resp:  [18-20] 18  SpO2:  [97 %-100 %] 99 %  BP: (117-146)/(59-81) 131/75     Weight: 76.7 kg (169 lb 1.5 oz)  Body mass index is 30.93 kg/m².    FHT: 120 Cat 1 (reassuring)  TOCO:  Q 2-3 minutes     Physical Exam:   Constitutional: She is oriented to person, place, and time. She appears well-developed and well-nourished. No distress.    HENT:   Head: Normocephalic and atraumatic.    Eyes: Conjunctivae and EOM are normal.      Pulmonary/Chest: Effort normal. No respiratory distress.          Genitourinary:    Vagina normal.             Musculoskeletal: Normal range of motion.       Neurological: She is alert and oriented to person, place, and time.    Skin: Skin is warm and dry.    Psychiatric: She has a normal mood and affect. Her behavior is normal. Judgment and thought content normal.        Cervix:  Dilation:  3  Effacement:  50%  Station: -3  Presentation: Vertex     Significant Labs:  Lab Results   Component Value Date     GROUPTRH O POS 06/24/2025    HEPBSAG Nonreactive 01/07/2025    RUBELLAIGGSC IMMUNE 03/10/2021    STREPBCULT No Group B Streptococcus isolated 10/20/2015    AFP 16.1 01/07/2025       I have personallly reviewed all pertinent lab results from the last 24 hours.  Recent Lab Results         06/24/25  0535        Baso # 0.08       Basophil % 0.4       Eos # 0.30       Eos % 1.4       Gran # (ANC) 13.39       Group & Rh O POS       Hematocrit 33.5       Hemoglobin 11.6       Immature Grans (Abs) 0.31  Comment: Mild elevation in immature granulocytes is non specific and can be seen in a variety of conditions including stress response, acute inflammation, trauma and pregnancy. Correlation with other laboratory and clinical findings is essential.       Immature Granulocytes 1.5       INDIRECT MAHNAZ NEG       Lymph # 5.27       Lymph % 25.1       MCH 30.6       MCHC 34.6       MCV 88       Mono # 1.67       Mono % 7.9       MPV 11.6       Neut % 63.7       nRBC 0       Platelet Count 268       RBC 3.79       RDW 14.1       WBC 21.02

## 2025-06-24 NOTE — HOSPITAL COURSE
Pt admitted for induction of labor.  Epidural for pain control. Delivered by .   PPD# 1 Routine pp care.  Meeting discharge criteria and requesting discharge.

## 2025-06-24 NOTE — HPI
Pt is a  @ 39 3/7 WGA who presents for scheduled induction of labor.  Pregnancy complicated by GBS positive.  H/o HSV on prophylaxis with no prodromal symptoms and no recent h/o outbreak.  Pt also has h/o Hepatitis C, started but did not complete treatment.  Pt also with h/o polysubstance abuse in the past.  She has been sober through this pregnancy and has completed a rehabilitation program.  She follows with [facility] and is on Suboxone.

## 2025-06-24 NOTE — ANESTHESIA PROCEDURE NOTES
Epidural    Patient location during procedure: OB   Reason for block: primary anesthetic   Reason for block: labor analgesia requested by patient and obstetrician  Diagnosis: pregnancy   Start time: 6/24/2025 9:14 AM  Timeout: 6/24/2025 9:13 AM  End time: 6/24/2025 9:17 AM    Staffing  Performing Provider: Ronald Jc CRNA  Authorizing Provider: Ronald Jc CRNA    Staffing  Performed by: Ronald Jc CRNA  Authorized by: Ronald Jc CRNA        Preanesthetic Checklist  Completed: patient identified, IV checked, site marked, risks and benefits discussed, surgical consent, monitors and equipment checked, pre-op evaluation, timeout performed, anesthesia consent given, hand hygiene performed and patient being monitored  Preparation  Patient position: sitting  Prep: ChloraPrep  Patient monitoring: ECG, Pulse Ox, continuous capnometry and Blood Pressure  Reason for block: primary anesthetic   Epidural  Skin Anesthetic: lidocaine 1%  Skin Wheal: 5 mL  Administration type: continuous  Approach: midline  Interspace: L3-4    Injection technique: JOSH saline  Needle and Epidural Catheter  Needle type: Tuohy   Needle gauge: 18  Needle length: 3.5 inches  Needle insertion depth: 8 cm  Catheter type: end hole  Catheter size: 18 G  Catheter at skin depth: 13 cm  Insertion Attempts: 1  Test dose: 5 mL of lidocaine 1.5% with Epi 1-to-200,000  Additional Documentation: incremental injection, negative aspiration for heme and CSF, no paresthesia on injection, no signs/symptoms of IV or SA injection, no significant pain on injection and no significant complaints from patient  Needle localization: anatomical landmarks  Assessment  Upper dermatomal levels - Left: T4  Right: T4   Dermatomal levels determined by alcohol wipe  Ease of block: easy  Patient's tolerance of the procedure: comfortable throughout block No inadvertent dural puncture with Tuohy.  Dural puncture performed with spinal needle.

## 2025-06-25 ENCOUNTER — LACTATION ENCOUNTER (OUTPATIENT)
Dept: OBSTETRICS AND GYNECOLOGY | Facility: HOSPITAL | Age: 40
End: 2025-06-25

## 2025-06-25 VITALS
RESPIRATION RATE: 18 BRPM | WEIGHT: 169.06 LBS | HEART RATE: 73 BPM | DIASTOLIC BLOOD PRESSURE: 75 MMHG | BODY MASS INDEX: 31.11 KG/M2 | OXYGEN SATURATION: 98 % | HEIGHT: 62 IN | SYSTOLIC BLOOD PRESSURE: 141 MMHG | TEMPERATURE: 97 F

## 2025-06-25 PROBLEM — O28.0 ABNORMAL QUAD SCREEN: Status: RESOLVED | Noted: 2025-01-28 | Resolved: 2025-06-25

## 2025-06-25 PROCEDURE — 25000003 PHARM REV CODE 250: Performed by: OBSTETRICS & GYNECOLOGY

## 2025-06-25 PROCEDURE — 63600175 PHARM REV CODE 636 W HCPCS: Performed by: OBSTETRICS & GYNECOLOGY

## 2025-06-25 RX ORDER — NALOXONE HYDROCHLORIDE 4 MG/.1ML
1 SPRAY NASAL ONCE
Qty: 2 EACH | Refills: 0 | Status: SHIPPED | OUTPATIENT
Start: 2025-06-25 | End: 2025-06-26

## 2025-06-25 RX ORDER — IBUPROFEN 800 MG/1
800 TABLET, FILM COATED ORAL 3 TIMES DAILY
Qty: 30 TABLET | Refills: 0 | Status: SHIPPED | OUTPATIENT
Start: 2025-06-25

## 2025-06-25 RX ORDER — BUPRENORPHINE HYDROCHLORIDE AND NALOXONE HYDROCHLORIDE DIHYDRATE 8; 2 MG/1; MG/1
8 TABLET SUBLINGUAL 2 TIMES DAILY PRN
Status: DISCONTINUED | OUTPATIENT
Start: 2025-06-25 | End: 2025-06-25 | Stop reason: HOSPADM

## 2025-06-25 RX ADMIN — GABAPENTIN 800 MG: 400 CAPSULE ORAL at 07:06

## 2025-06-25 RX ADMIN — ACETAMINOPHEN 650 MG: 325 TABLET ORAL at 03:06

## 2025-06-25 RX ADMIN — PRENATAL VIT W/ FE FUMARATE-FA TAB 27-0.8 MG 1 TABLET: 27-0.8 TAB at 07:06

## 2025-06-25 RX ADMIN — BUPRENORPHINE AND NALOXONE 8 MG: 8; 2 TABLET SUBLINGUAL at 07:06

## 2025-06-25 RX ADMIN — IBUPROFEN 600 MG: 600 TABLET ORAL at 06:06

## 2025-06-25 NOTE — LACTATION NOTE
This note was copied from a baby's chart.  Mother reports that she has decided to exclusively formula feed, states that she just thinks that will be best for everyone. Support provided, encouraged to call with any needs, v/u.

## 2025-06-25 NOTE — NURSING
Notified KRIS Cyr that patient will be discharged within the hour. Arti to come to the unit in the next 20 minutes. Naloxone given to patient with Naloxone educational flyer. Discussed how to use Naloxone and criteria for use. Discussed QR code on flyer if patient needs further education or reference. Maternal Health and Substance Use Resource flyer given to patient. Pt verbalized understanding and stated she has two at home from [facility].

## 2025-06-25 NOTE — LACTATION NOTE
Mother reports that she has been having some trouble latching, bf previous children. Gave one bottle last night and feels that infant took bottle well, may want to EP. Mother reports having breast pump at home. Just finished feeding about 20 minutes ago. Breast pump placed at bedside, reviewed POC / goals. Encouraged to call when ready to pump, mother v/u. Denies further questions or needs at this time.

## 2025-06-25 NOTE — NURSING
Pt states would like to take half of scheduled PM dose of Seroquel. Orders adjusted. See MAR. MD aware.

## 2025-06-25 NOTE — NURSING
Pt in stable condition. VSS, NAD. Mother baby bonding noted.Uterus firm, midline @ 2 below. Vaginal bleeding light, patient reports no clots and reports passing flatus. Pt ambulates to bathroom without help, voiding without difficulty. 1st degree laceration noted. Pt educated on tucks and dermaplast use. Pt v/u. Pt would like to be discharged today. Notified KRIS Cyr of patient discharge today for inpatient consult.

## 2025-06-25 NOTE — PLAN OF CARE
[facility] - Labor & Delivery  OB Initial Discharge Assessment       Primary Care Provider: No, Primary Doctor    Expected Discharge Date: 2025    Initial Assessment (most recent)       OB Discharge Planning Assessment - 25 1255          OB Discharge Planning Assessment    Assessment Type Discharge Planning Assessment     Source of Information patient     Verified Demographic and Insurance Information Yes     Insurance Medicaid     Medicaid [employer]     Medicaid Insurance Primary     People in Home child(adelaide), dependent;parent(s)     Name(s) of People in Home Father and      Number people in home 3     Relationship Status In relationship     Name of Support/Comfort Primary Source Julian Naya (P)      Employed No (P)      Currently Enrolled in School No (P)      Highest Level of Education High School Diploma (P)      Father's Involvement Fully Involved (P)      Is Father signing the birth certificate Yes (P)      Father's Address [address] [place] (P)      Father Currently Enrolled in School No (P)      Father's Employer self employed construction (P)      Family Involvement Moderate (P)      Primary Contact Name and Number Father (P)      Received Prenatal Care Yes (P)      Transportation Anticipated car, drives self (P)      Receive WIC Benefits Already certified, will apply for new born (P)       Arrangements Self (P)      Adoption Planned no (P)      Infant Feeding Plan breastfeeding;formula feeding (P)      Previous Breastfeeding Experience yes (P)      Does baby have crib or safe sleep space? Yes (P)      Do you have a car seat? Yes (P)      Has other essential care items? Clothing;Bottles (P)      Pediatrician [facility]. (P)      Resource/Environmental Concerns none (P)      Equipment Currently Used at Home none (P)      Potential Discharge Needs None (P)      Resources/Education Provided WIC;Post Partum Depression (P)      DME Needed Upon  Discharge  none (P)      Discharge Plan A Home (P)      Discharge Plan B Home with family (P)      Do you have any problems affording any of your prescribed medications? No (P)                             Healthcare Directives:   Advance Directive  (If Adv Dir status is received, view document under Adv Dir in header or Chart Review Media tab): Patient does not have Advance Directive, declines information.       Assessment completed at bedside with patient, father of  (Julian), and  present in room. Patient stated she will discharge [address] her [address] Residing there will be , patient, and patient's father. Patient reports having all she needs for . Patient stated she hopes to be able to breast feed and pump, but she is already established with WIC. Patient has breast pump at home. Patient disclosed that she is diagnosed with anxiety, depression, and bipolar (manic). Patient stated she goes to [facility] for counseling and medication management. Patient stated she completed rehab in 2024 and has been clean every since. Patient was provided mental health and substance abuse resources by nursing staff. Patient voiced no needs at this time. Nursing reports mother is bonding well with  and appropriate with care. SW to remain available if need arise.

## 2025-06-25 NOTE — PROGRESS NOTES
[facility] - Labor & Delivery  Obstetrics  Postpartum Progress Note    Patient Name: Joana Thomas  MRN: [id number]  Admission Date: 2025  Hospital Length of Stay: 1 days  Attending Physician: Dean Katz MD  Primary Care Provider: Tish Primary Doctor    Subjective:     Principal Problem: (spontaneous vaginal delivery)    Hospital Course:  Pt admitted for induction of labor.  Epidural for pain control. Delivered by .   PPD# 1 Routine pp care.  Meeting discharge criteria and requesting discharge.    Interval History: PPD#1    She is doing well this morning. She is tolerating a regular diet without nausea or vomiting. She is voiding spontaneously. She is ambulating. Vaginal bleeding is mild. She denies fever or chills. Abdominal pain is mild and controlled with oral medications. She Is breastfeeding. She desires circumcision for her male baby: not applicable.    Objective:     Vital Signs (Most Recent):  Temp: 96.5 °F (35.8 °C) (25)  Pulse: 70 (25)  Resp: 20 (25)  BP: (!) 146/78 (25)  SpO2: 98 % (25) Vital Signs (24h Range):  Temp:  [96.1 °F (35.6 °C)-99 °F (37.2 °C)] 96.5 °F (35.8 °C)  Pulse:  [] 70  Resp:  [16-20] 20  SpO2:  [96 %-100 %] 98 %  BP: (110-155)/(59-80) 146/78     Weight: 76.7 kg (169 lb 1.5 oz)  Body mass index is 30.93 kg/m².      Intake/Output Summary (Last 24 hours) at 2025 0708  Last data filed at 2025 1720  Gross per 24 hour   Intake 2533.31 ml   Output 77 ml   Net 2456.31 ml         Significant Labs:  Lab Results   Component Value Date    GROUPTRH O POS 2025    HEPBSAG Nonreactive 2025    RUBELLAIGGSC IMMUNE 03/10/2021    STREPBCULT No Group B Streptococcus isolated 10/20/2015    AFP 16.1 2025     Recent Labs   Lab 25  0535   HGB 11.6*   HCT 33.5*       I have personallly reviewed all pertinent lab results from the last 24 hours.    Physical Exam:   Constitutional: She is oriented to  person, place, and time. She appears well-developed and well-nourished. No distress.    HENT:   Head: Normocephalic and atraumatic.    Eyes: Conjunctivae and EOM are normal.      Pulmonary/Chest: Effort normal.        Abdominal: Soft.     Genitourinary:    Genitourinary Comments: Fundus firm below umbilicus  Lochia minimal             Musculoskeletal: Normal range of motion. No tenderness.       Neurological: She is alert and oriented to person, place, and time.         Review of Systems  Assessment/Plan:     39 y.o. female  for:    *  (spontaneous vaginal delivery)  Routine pp care      Pregnancy complicated by suboxone maintenance, antepartum, third trimester  Noted, Patient follows with [facility].  She has completed a rehabilitation program.   Doing well and has good support.       Multigravida of advanced maternal age in third trimester  Pt had negative NIPT testing.    - Hepatitis C virus infection in mother during pregnancy  Noted        Disposition: As patient meets milestones, will plan to discharge today.    Dean Katz MD  Obstetrics  [place] - Labor & Delivery

## 2025-06-25 NOTE — SUBJECTIVE & OBJECTIVE
Interval History: PPD#1    She is doing well this morning. She is tolerating a regular diet without nausea or vomiting. She is voiding spontaneously. She is ambulating. Vaginal bleeding is mild. She denies fever or chills. Abdominal pain is mild and controlled with oral medications. She Is breastfeeding. She desires circumcision for her male baby: not applicable.    Objective:     Vital Signs (Most Recent):  Temp: 96.5 °F (35.8 °C) (06/25/25 0451)  Pulse: 70 (06/25/25 0451)  Resp: 20 (06/25/25 0451)  BP: (!) 146/78 (06/25/25 0451)  SpO2: 98 % (06/25/25 0451) Vital Signs (24h Range):  Temp:  [96.1 °F (35.6 °C)-99 °F (37.2 °C)] 96.5 °F (35.8 °C)  Pulse:  [] 70  Resp:  [16-20] 20  SpO2:  [96 %-100 %] 98 %  BP: (110-155)/(59-80) 146/78     Weight: 76.7 kg (169 lb 1.5 oz)  Body mass index is 30.93 kg/m².      Intake/Output Summary (Last 24 hours) at 6/25/2025 0708  Last data filed at 6/24/2025 1720  Gross per 24 hour   Intake 2533.31 ml   Output 77 ml   Net 2456.31 ml         Significant Labs:  Lab Results   Component Value Date    GROUPTRH O POS 06/24/2025    HEPBSAG Nonreactive 01/07/2025    RUBELLAIGGSC IMMUNE 03/10/2021    STREPBCULT No Group B Streptococcus isolated 10/20/2015    AFP 16.1 01/07/2025     Recent Labs   Lab 06/24/25  0535   HGB 11.6*   HCT 33.5*       I have personallly reviewed all pertinent lab results from the last 24 hours.    Physical Exam:   Constitutional: She is oriented to person, place, and time. She appears well-developed and well-nourished. No distress.    HENT:   Head: Normocephalic and atraumatic.    Eyes: Conjunctivae and EOM are normal.      Pulmonary/Chest: Effort normal.        Abdominal: Soft.     Genitourinary:    Genitourinary Comments: Fundus firm below umbilicus  Lochia minimal             Musculoskeletal: Normal range of motion. No tenderness.       Neurological: She is alert and oriented to person, place, and time.         Review of Systems

## 2025-06-25 NOTE — DISCHARGE SUMMARY
[place] - Labor & Delivery  Obstetrics  Discharge Summary      Patient Name: Joana Thomas  MRN: [id number]  Admission Date: 2025  Hospital Length of Stay: 1 days  Discharge Date and Time: 2025 7:14 AM  Attending Physician: Dean Katz MD   Discharging Provider: Dean Katz MD   Primary Care Provider: Tish, Primary Doctor    HPI: Pt is a  @ 39 3/7 WGA who presents for scheduled induction of labor.  Pregnancy complicated by GBS positive.  H/o HSV on prophylaxis with no prodromal symptoms and no recent h/o outbreak.  Pt also has h/o Hepatitis C, started but did not complete treatment.  Pt also with h/o polysubstance abuse in the past.  She has been sober through this pregnancy and has completed a rehabilitation program.  She follows with [facility] and is on Suboxone.          * No surgery found *     Hospital Course:   Pt admitted for induction of labor.  Epidural for pain control. Delivered by .   PPD# 1 Routine pp care.  Meeting discharge criteria and requesting discharge.         Final Active Diagnoses:    Diagnosis Date Noted POA    PRINCIPAL PROBLEM:   (spontaneous vaginal delivery) [O80] 2025 Not Applicable    Tobacco dependency [F17.200] 2025 Yes    Pregnancy complicated by suboxone maintenance, antepartum, third trimester [O99.323, F11.20] 2025 Not Applicable    Multigravida of advanced maternal age in third trimester [O09.523] 2025 Yes    - Hepatitis C virus infection in mother during pregnancy [O98.419, B19.20] 2025 Yes      Problems Resolved During this Admission:        Significant Diagnostic Studies: N/A      Feeding Method: breast    Immunizations       Date Immunization Status Dose Route/Site Given by    25 175 MMR Incomplete 0.5 mL Subcutaneous/     25 Tdap Incomplete 0.5 mL Intramuscular/             Delivery:    Episiotomy: None   Lacerations: 1st   Repair suture:     Repair # of packets: 1   Blood  "loss (ml):       Birth information:  YOB: 2023   Time of birth: 4:40 PM   Sex: female   Delivery type: Vaginal, Spontaneous   Gestational Age: 39w3d     Measurements    Weight: 3300 g  Weight (lbs): 7 lb 4.4 oz  Length: 47 cm  Length (in): 18.5"  Head circumference: 34.3 cm  Chest circumference: 34.3 cm  Abdominal girth: 31.8 cm         Delivery Clinician: Delivery Providers    Delivering clinician: Dean Katz MD   Provider Role    Irene Lopez RN Registered Nurse    Fay Elizabeth RN Registered Nurse    Milton Kwan LPN Licensed Practical Nurse    Becky Soto LPN Licensed Practical Nurse             Additional  information:  Forceps:    Vacuum:    Breech:    Observed anomalies      Living?:     Apgars    Living status: Living  Apgar Component Scores:  1 min.:  5 min.:  10 min.:  15 min.:  20 min.:    Skin color:  0  0       Heart rate:  2  2       Reflex irritability:  2  2       Muscle tone:  2  2       Respiratory effort:  2  2       Total:  8  8       Apgars assigned by: FABIO KWAN LPN         Placenta: Delivered:       appearance  Pending Diagnostic Studies:       None            Discharged Condition: good    Disposition: Home or Self Care    Follow Up:   Follow-up Information       Dean Katz MD Follow up in 2 week(s).    Specialty: Obstetrics and Gynecology  Contact information:  [address] [id number]  [phone]                           Patient Instructions:      Diet Adult Regular     Lifting restrictions   Order Comments: No lifting greater than 15 pounds     Pelvic Rest     Notify your health care provider if you experience any of the following:  temperature >100.4     Notify your health care provider if you experience any of the following:  persistent nausea and vomiting or diarrhea     Notify your health care provider if you experience any of the following:  severe uncontrolled pain     Notify your health care provider if you experience " any of the following:  redness, tenderness, or signs of infection (pain, swelling, redness, odor or green/yellow discharge around incision site)     Notify your health care provider if you experience any of the following:  difficulty breathing or increased cough     Notify your health care provider if you experience any of the following:  severe persistent headache     Notify your health care provider if you experience any of the following:  worsening rash     Notify your health care provider if you experience any of the following:  persistent dizziness, light-headedness, or visual disturbances     Notify your health care provider if you experience any of the following:  increased confusion or weakness     Notify your health care provider if you experience any of the following:   Order Comments: Heavy vaginal bleeding (saturating 2 pads in 1 hour)     Reason for not Prescribing Nicotine Replacement     Order Specific Question Answer Comments   Reason for not Prescribing: Patient refused      Reason for not Ordering Smoking Cessation Referral     Order Specific Question Answer Comments   Reason for not ordering: Not medically appropriate at this time patient in program     Activity as tolerated     Medications:  Current Discharge Medication List        START taking these medications    Details   ibuprofen (ADVIL,MOTRIN) 800 MG tablet Take 1 tablet (800 mg total) by mouth 3 (three) times daily.  Qty: 30 tablet, Refills: 0      naloxone (NARCAN) 4 mg/actuation Spry 1 spray (4 mg total) by Nasal route once. for 1 dose  Qty: 1 each, Refills: 0           CONTINUE these medications which have NOT CHANGED    Details   prenatal vit no.130-iron-folic (PRENATAL VITAMIN) 27 mg iron- 800 mcg Tab Take 1 tablet by mouth once daily.  Qty: 30 tablet, Refills: 6    Associated Diagnoses: Multigravida of advanced maternal age in second trimester      QUEtiapine (SEROQUEL) 300 MG Tab Take 300 mg by mouth every evening.      SUBOXONE 8-2  mg Place 2 Film under the tongue once daily.      valACYclovir (VALTREX) 1000 MG tablet Take 1 tablet (1,000 mg total) by mouth once daily.  Qty: 30 tablet, Refills: 1      cariprazine (VRAYLAR) 1.5 mg Cap Take 1.5 mg by mouth.      dextroamphetamine-amphetamine (ADDERALL) 30 mg Tab       gabapentin (NEURONTIN) 800 MG tablet Take 800 mg by mouth 2 (two) times daily.           STOP taking these medications       dextroamphetamine-amphetamine (ADDERALL) 12.5 MG tablet Comments:   Reason for Stopping:               Dean Katz MD  Obstetrics  [place] - Labor & Delivery

## 2025-06-25 NOTE — DISCHARGE INSTRUCTIONS
Postpartum Discharge Instructions:    No heavy lifting, straining, frequent rest periods  Pelvic rest--no douching, tampons, or intercourse until released by MD  Talk to your doctor about birth control--remember breastfeeding is not a method of birth control  Notify MD if bleeding becomes heavier than usual and if large clots, painful cramping,or foul odor develops.   Vaginal discharge will lighten and decrease in amount gradually.    Cleanse perineal area from front to back after urination or having a bowel movement.  Tub soak or portable sitz bath at home.  Apply clean pad with Epifoam and Tucks to perineal area.  Episiotomy stitches will dissolve within 2-3 weeks  If not breastfeeding, wear tight fitting sports bra for 1 week--remove only to bathe  Remember to keep your breast clean and dry to prevent any cracking  Notify MD if breast become reddened,swollen, nipples bleed or crack, or fever greater than 100.4  Notify MD of pain, swelling,  Redness, or heat developing in back of leg especially when foot is flexed toward body  Look at incision everyday for redness, swelling, or drainage which may indicate infection  Notify MD of pain not relieved by pain medication.  Call MD or go to ER for any concerns    [email or web address] Discharge Instructions:    No heavy lifting, straining, frequent rest periods  Pelvic rest--no douching, tampons, or intercourse until released by MD  Talk to your doctor about birth control--remember breastfeeding is not a method of birth control  Notify MD if bleeding becomes heavier than usual and if large clots, painful cramping,or foul odor develops.   Vaginal discharge will lighten and decrease in amount gradually.    Cleanse perineal area from front to back after urination or having a bowel movement.  Tub soak or portable sitz bath at home.  Apply clean pad with Epifoam and Tucks to perineal area.  Episiotomy stitches will dissolve within 2-3 weeks  If not breastfeeding, wear  tight fitting sports bra for 1 week--remove only to bathe  Remember to keep your breast clean and dry to prevent any cracking  Notify MD if breast become reddened,swollen, nipples bleed or crack, or fever greater than 100.4  Notify MD of pain, swelling,  Redness, or heat developing in back of leg especially when foot is flexed toward body  Look at incision everyday for redness, swelling, or drainage which may indicate infection  Notify MD of pain not relieved by pain medication.  Call MD or go to ER for any concerns

## 2025-06-25 NOTE — NURSING
Discharge instructions and Urgent Maternal Warning Signs given and reviewed with mother. Discussed with mother the importance of notifying the nursing staff when she leaves the unit that someone is to remain with the baby. Pt verbalizes understanding. Mother to room in with . Pt discharged.

## 2025-06-27 NOTE — PLAN OF CARE
[facility] - Labor & Delivery  Discharge Final Note    Primary Care Provider: No, Primary Doctor    Expected Discharge Date: 6/27/2025    Final Discharge Note (most recent)       Final Note - 06/27/25 1706          Final Note    Assessment Type Final Discharge Note (P)      Anticipated Discharge Disposition Home or Self Care (P)      Hospital Resources/Appts/Education Provided Provided education on problems/symptoms using teachback;Provided patient/caregiver with written discharge plan information (P)         Post-Acute Status    Discharge Delays None known at this time (P)                      Important Message from Medicare             Contact Info       Dean Katz MD   Specialty: Obstetrics and Gynecology   Relationship: Consulting Physician    [place] [id number]   Phone: [phone]       Next Steps: Follow up in 2 week(s)

## 2025-07-18 ENCOUNTER — POSTPARTUM VISIT (OUTPATIENT)
Dept: OBSTETRICS AND GYNECOLOGY | Facility: CLINIC | Age: 40
End: 2025-07-18
Payer: MEDICAID

## 2025-07-18 VITALS
DIASTOLIC BLOOD PRESSURE: 78 MMHG | BODY MASS INDEX: 27.23 KG/M2 | SYSTOLIC BLOOD PRESSURE: 120 MMHG | HEART RATE: 70 BPM | HEIGHT: 62 IN | WEIGHT: 148 LBS

## 2025-07-18 DIAGNOSIS — N91.2 AMENORRHEA: ICD-10-CM

## 2025-07-18 DIAGNOSIS — Z30.011 ENCOUNTER FOR INITIAL PRESCRIPTION OF CONTRACEPTIVE PILLS: ICD-10-CM

## 2025-07-18 DIAGNOSIS — Z13.32 ENCOUNTER FOR SCREENING FOR MATERNAL DEPRESSION: ICD-10-CM

## 2025-07-18 DIAGNOSIS — Z30.017 ENCOUNTER FOR INITIAL PRESCRIPTION OF NEXPLANON: ICD-10-CM

## 2025-07-18 PROCEDURE — 99999 PR PBB SHADOW E&M-EST. PATIENT-LVL III: CPT | Mod: PBBFAC,,, | Performed by: OBSTETRICS & GYNECOLOGY

## 2025-07-18 PROCEDURE — 99213 OFFICE O/P EST LOW 20 MIN: CPT | Mod: PBBFAC | Performed by: OBSTETRICS & GYNECOLOGY

## 2025-07-18 RX ORDER — NORETHINDRONE 0.35 MG/1
1 TABLET ORAL DAILY
Qty: 28 TABLET | Refills: 11 | Status: SHIPPED | OUTPATIENT
Start: 2025-07-18 | End: 2026-07-18

## 2025-07-18 NOTE — PROGRESS NOTES
CC: Post-partum follow-up    Joana Thomas is a 39 y.o. female  presents for a postpartum visit.  She is status post   3 weeks ago.  Her hospitalization was not complicated.  She is not breastfeeding.  She desires Nexplanon for contraception with OCP in interim.  She denies postpartum depression. [place] depression scale score 1. She and the baby are doing well.  No pain.  No fever.   No bowel / bladder complaints.     Her last pap was No result found      ROS:  GENERAL: No fever, chills, fatigability.  VULVAR: No pain, no lesions and no itching.  VAGINAL: No relaxation, no itching, no discharge, no abnormal bleeding and no lesions.  ABDOMEN: No abdominal pain. Denies nausea. Denies vomiting. No diarrhea. No constipation  BREAST: Denies pain. No lumps. No discharge.  URINARY: No incontinence, no nocturia, no frequency and no dysuria.  CARDIOVASCULAR: No chest pain. No shortness of breath. No leg cramps.  NEUROLOGICAL: No headaches. No vision changes.    Past Medical History:   Diagnosis Date    Abnormal Pap smear of cervix     ADD (attention deficit disorder)     on [facility]    Addiction to drug 2017    Offically diagnosed at [facility] in     ADHD (attention deficit hyperactivity disorder) 2008    ADD treatment on an outpatient basis in [place]    Alcohol abuse 2015    DWI and went to [place] for treatment.    Anxiety 2017    [facility]    Depression 2017    [facility]    Hep B complicating pregnancy     Hep C w/o coma, chronic     Herpes simplex virus (HSV) infection     Hx of psychiatric care 2008    [place] for ADD    Liver disease     [facility]    Psychiatric problem 2008    Restlessness    Psychosis 2017    [facility]    Therapy 2008    Outpatient therapy in [place]    Thyroid disease [address] [place] [address]     Past Surgical History:   Procedure Laterality Date    DILATION AND CURETTAGE OF UTERUS       TRANSESOPHAGEAL ECHOCARDIOGRAPHY N/A 2022    Procedure: ECHOCARDIOGRAM, TRANSESOPHAGEAL;  Surgeon: Adiel Kirby MD;  Location: [facility];  Service: Cardiology;  Laterality: N/A;     Review of patient's allergies indicates:  No Known Allergies  Current Medications[1]      Vitals:    25 1203   BP: 120/78   Pulse: 70       Physical Exam  Vitals reviewed.   Constitutional:       General: She is not in acute distress.     Appearance: She is well-developed.   HENT:      Head: Normocephalic and atraumatic.   Eyes:      Conjunctiva/sclera: Conjunctivae normal.   Pulmonary:      Effort: Pulmonary effort is normal.   Musculoskeletal:      Cervical back: Normal range of motion and neck supple.   Neurological:      Mental Status: She is alert and oriented to person, place, and time.   Psychiatric:         Behavior: Behavior normal.         Thought Content: Thought content normal.         Judgment: Judgment normal.           Assessment:    1. Normal postpartum exam  2. Doing well S/P   3. PP depression screening       Plan:    1. Routine follow up.  2. Instructions / precautions reviewed  3. Contraceptive counseling  4. Return: in 2-3 weeks for pp follow up and Nexplanon insertion         [1]   Current Outpatient Medications:     cariprazine (VRAYLAR) 1.5 mg Cap, Take 1.5 mg by mouth., Disp: , Rfl:     dextroamphetamine-amphetamine (ADDERALL) 30 mg Tab, , Disp: , Rfl:     gabapentin (NEURONTIN) 800 MG tablet, Take 800 mg by mouth 2 (two) times daily., Disp: , Rfl:     ibuprofen (ADVIL,MOTRIN) 800 MG tablet, Take 1 tablet (800 mg total) by mouth 3 (three) times daily., Disp: 30 tablet, Rfl: 0    QUEtiapine (SEROQUEL) 300 MG Tab, Take 300 mg by mouth every evening., Disp: , Rfl:     SUBOXONE 8-2 mg, Place 2 Film under the tongue once daily., Disp: , Rfl:     valACYclovir (VALTREX) 1000 MG tablet, Take 1 tablet (1,000 mg total) by mouth once daily., Disp: 30 tablet, Rfl: 1    prenatal vit no.130-iron-folic  (PRENATAL VITAMIN) 27 mg iron- 800 mcg Tab, Take 1 tablet by mouth once daily. (Patient not taking: Reported on 7/18/2025), Disp: 30 tablet, Rfl: 6